# Patient Record
Sex: FEMALE | Race: WHITE | Employment: FULL TIME | URBAN - METROPOLITAN AREA
[De-identification: names, ages, dates, MRNs, and addresses within clinical notes are randomized per-mention and may not be internally consistent; named-entity substitution may affect disease eponyms.]

---

## 2017-04-06 ENCOUNTER — ANESTHESIA EVENT (OUTPATIENT)
Dept: ENDOSCOPY | Age: 62
End: 2017-04-06
Payer: OTHER GOVERNMENT

## 2017-04-07 ENCOUNTER — SURGERY (OUTPATIENT)
Age: 62
End: 2017-04-07

## 2017-04-07 ENCOUNTER — ANESTHESIA (OUTPATIENT)
Dept: ENDOSCOPY | Age: 62
End: 2017-04-07
Payer: OTHER GOVERNMENT

## 2017-04-07 ENCOUNTER — HOSPITAL ENCOUNTER (OUTPATIENT)
Age: 62
Setting detail: OUTPATIENT SURGERY
Discharge: HOME | End: 2017-04-07
Attending: INTERNAL MEDICINE | Admitting: INTERNAL MEDICINE
Payer: OTHER GOVERNMENT

## 2017-04-07 VITALS
SYSTOLIC BLOOD PRESSURE: 159 MMHG | HEIGHT: 68 IN | OXYGEN SATURATION: 99 % | WEIGHT: 143.44 LBS | TEMPERATURE: 97 F | DIASTOLIC BLOOD PRESSURE: 85 MMHG | BODY MASS INDEX: 21.74 KG/M2 | HEART RATE: 83 BPM | RESPIRATION RATE: 16 BRPM

## 2017-04-07 PROCEDURE — 76040000019: Performed by: INTERNAL MEDICINE

## 2017-04-07 PROCEDURE — 74011250636 HC RX REV CODE- 250/636: Performed by: NURSE ANESTHETIST, CERTIFIED REGISTERED

## 2017-04-07 PROCEDURE — 74011250636 HC RX REV CODE- 250/636

## 2017-04-07 PROCEDURE — 76060000031 HC ANESTHESIA FIRST 0.5 HR: Performed by: INTERNAL MEDICINE

## 2017-04-07 PROCEDURE — 74011000250 HC RX REV CODE- 250

## 2017-04-07 PROCEDURE — 77030009426 HC FCPS BIOP ENDOSC BSC -B: Performed by: INTERNAL MEDICINE

## 2017-04-07 PROCEDURE — 88305 TISSUE EXAM BY PATHOLOGIST: CPT | Performed by: INTERNAL MEDICINE

## 2017-04-07 RX ORDER — BISMUTH SUBSALICYLATE 262 MG
1 TABLET,CHEWABLE ORAL DAILY
COMMUNITY

## 2017-04-07 RX ORDER — PROPOFOL 10 MG/ML
INJECTION, EMULSION INTRAVENOUS
Status: DISCONTINUED | OUTPATIENT
Start: 2017-04-07 | End: 2017-04-07 | Stop reason: HOSPADM

## 2017-04-07 RX ORDER — LIDOCAINE HYDROCHLORIDE 20 MG/ML
INJECTION, SOLUTION EPIDURAL; INFILTRATION; INTRACAUDAL; PERINEURAL AS NEEDED
Status: DISCONTINUED | OUTPATIENT
Start: 2017-04-07 | End: 2017-04-07 | Stop reason: HOSPADM

## 2017-04-07 RX ORDER — SODIUM CHLORIDE, SODIUM LACTATE, POTASSIUM CHLORIDE, CALCIUM CHLORIDE 600; 310; 30; 20 MG/100ML; MG/100ML; MG/100ML; MG/100ML
100 INJECTION, SOLUTION INTRAVENOUS CONTINUOUS
Status: DISCONTINUED | OUTPATIENT
Start: 2017-04-07 | End: 2017-04-07 | Stop reason: HOSPADM

## 2017-04-07 RX ORDER — PROPOFOL 10 MG/ML
INJECTION, EMULSION INTRAVENOUS AS NEEDED
Status: DISCONTINUED | OUTPATIENT
Start: 2017-04-07 | End: 2017-04-07 | Stop reason: HOSPADM

## 2017-04-07 RX ADMIN — SODIUM CHLORIDE, SODIUM LACTATE, POTASSIUM CHLORIDE, AND CALCIUM CHLORIDE 100 ML/HR: 600; 310; 30; 20 INJECTION, SOLUTION INTRAVENOUS at 09:57

## 2017-04-07 RX ADMIN — PROPOFOL 50 MG: 10 INJECTION, EMULSION INTRAVENOUS at 11:55

## 2017-04-07 RX ADMIN — PROPOFOL 140 MCG/KG/MIN: 10 INJECTION, EMULSION INTRAVENOUS at 11:41

## 2017-04-07 RX ADMIN — PROPOFOL 100 MG: 10 INJECTION, EMULSION INTRAVENOUS at 11:40

## 2017-04-07 RX ADMIN — LIDOCAINE HYDROCHLORIDE 100 MG: 20 INJECTION, SOLUTION EPIDURAL; INFILTRATION; INTRACAUDAL; PERINEURAL at 11:40

## 2017-04-07 NOTE — DISCHARGE INSTRUCTIONS
For the next 12 hours you should not:   1. drive   2. drink alcohol   3. operate any machinery   4. engage in activities that require mental sharpness or manual dexterity such as     cooking   5. take any drugs other than those prescribed by a physician   6. make any legal or financial decisions  Call your doctor's office immediately, if:   1. increased and continuing rectal bleeding   2. fever   3. increased abdominal pain    Take it easy today and resume normal activity tomorrow. Resume previous diet. Colonoscopy: What to Expect at [address] [place] [address]  After you have a colonoscopy, you will stay at the clinic for 1 to 2 hours until the medicines wear off. Then you can go home. But you will need to arrange for a ride. Your doctor will tell you when you can eat and do your other usual activities. Your doctor will talk to you about when you will need your next colonoscopy. Your doctor can help you decide how often you need to be checked. This will depend on the results of your test and your risk for colorectal cancer. After the test, you may be bloated or have gas pains. You may need to pass gas. If a biopsy was done or a polyp was removed, you may have streaks of blood in your stool (feces) for a few days. This care sheet gives you a general idea about how long it will take for you to recover. But each person recovers at a different pace. Follow the steps below to get better as quickly as possible. How can you care for yourself at home? Activity  · Rest when you feel tired. · You can do your normal activities when it feels okay to do so. Diet  · Follow your doctor's directions for eating. · Unless your doctor has told you not to, drink plenty of fluids. This helps to replace the fluids that were lost during the colon prep. · Do not drink alcohol. Medicines  · Your doctor will tell you if and when you can restart your medicines.  He or she will also give you instructions about taking any new medicines. · If you take blood thinners, such as warfarin (Coumadin), clopidogrel (Plavix), or aspirin, be sure to talk to your doctor. He or she will tell you if and when to start taking those medicines again. Make sure that you understand exactly what your doctor wants you to do. · If polyps were removed or a biopsy was done during the test, your doctor may tell you not to take aspirin or other anti-inflammatory medicines for a few days. These include ibuprofen (Advil, Motrin) and naproxen (Aleve). Other instructions  · For your safety, do not drive or operate machinery until the medicine wears off and you can think clearly. Your doctor may tell you not to drive or operate machinery until the day after your test.  · Do not sign legal documents or make major decisions until the medicine wears off and you can think clearly. The anesthesia can make it hard for you to fully understand what you are agreeing to. Follow-up care is a key part of your treatment and safety. Be sure to make and go to all appointments, and call your doctor if you are having problems. It's also a good idea to know your test results and keep a list of the medicines you take. When should you call for help? Call 911 anytime you think you may need emergency care. For example, call if:  · You passed out (lost consciousness). · You pass maroon or bloody stools. · You have severe belly pain. Call your doctor now or seek immediate medical care if:  · Your stools are black and tarlike. · Your stools have streaks of blood, but you did not have a biopsy or any polyps removed. · You have belly pain, or your belly is swollen and firm. · You vomit. · You have a fever. · You are very dizzy. Watch closely for changes in your health, and be sure to contact your doctor if you have any problems. Where can you learn more? Go to http://chelo-andre.info/.   Enter E264 in the search box to learn more about \"Colonoscopy: What to Expect at Home. \"  Current as of: August 9, 2016  Content Version: 11.2  © 5976-2491 [employer]. Care instructions adapted under license by [employer] (which disclaims liability or warranty for this information). If you have questions about a medical condition or this instruction, always ask your healthcare professional. [facility] any warranty or liability for your use of this information. DISCHARGE SUMMARY from Nurse    The following personal items are in your possession at time of discharge:    Dental Appliances: Partials, At home  Visual Aid: Glasses                            PATIENT INSTRUCTIONS:    After general anesthesia or intravenous sedation, for 24 hours or while taking prescription Narcotics:  · Limit your activities  · Do not drive and operate hazardous machinery  · Do not make important personal or business decisions  · Do  not drink alcoholic beverages  · If you have not urinated within 8 hours after discharge, please contact your surgeon on call. Report the following to your surgeon:  · Excessive pain, swelling, redness or odor of or around the surgical area  · Temperature over 100.5  · Nausea and vomiting lasting longer than 4 hours or if unable to take medications  · Any signs of decreased circulation or nerve impairment to extremity: change in color, persistent  numbness, tingling, coldness or increase pain  · Any questions        What to do at Home:  Recommended activity: Activity as tolerated and no driving for today. *  Please give a list of your current medications to your Primary Care Provider. *  Please update this list whenever your medications are discontinued, doses are      changed, or new medications (including over-the-counter products) are added. *  Please carry medication information at all times in case of emergency situations.           These are general instructions for a healthy lifestyle:    No smoking/ No tobacco products/ Avoid exposure to second hand smoke    Surgeon General's Warning:  Quitting smoking now greatly reduces serious risk to your health. Obesity, smoking, and sedentary lifestyle greatly increases your risk for illness    A healthy diet, regular physical exercise & weight monitoring are important for maintaining a healthy lifestyle    You may be retaining fluid if you have a history of heart failure or if you experience any of the following symptoms:  Weight gain of 3 pounds or more overnight or 5 pounds in a week, increased swelling in our hands or feet or shortness of breath while lying flat in bed. Please call your doctor as soon as you notice any of these symptoms; do not wait until your next office visit. Recognize signs and symptoms of STROKE:    F-face looks uneven    A-arms unable to move or move unevenly    S-speech slurred or non-existent    T-time-call 911 as soon as signs and symptoms begin-DO NOT go       Back to bed or wait to see if you get better-TIME IS BRAIN. Warning Signs of HEART ATTACK     Call 911 if you have these symptoms:   Chest discomfort. Most heart attacks involve discomfort in the center of the chest that lasts more than a few minutes, or that goes away and comes back. It can feel like uncomfortable pressure, squeezing, fullness, or pain.  Discomfort in other areas of the upper body. Symptoms can include pain or discomfort in one or both arms, the back, neck, jaw, or stomach.  Shortness of breath with or without chest discomfort.  Other signs may include breaking out in a cold sweat, nausea, or lightheadedness. Don't wait more than five minutes to call 911 - MINUTES MATTER! Fast action can save your life. Calling 911 is almost always the fastest way to get lifesaving treatment. Emergency Medical Services staff can begin treatment when they arrive -- up to an hour sooner than if someone gets to the hospital by car.        The discharge information has been reviewed with the patient and spouse. The patient and spouse verbalized understanding. Discharge medications reviewed with the patient and spouse and appropriate educational materials and side effects teaching were provided. Patient armband removed and given to patient to take home.   Patient was informed of the privacy risks if armband lost or stolen

## 2017-04-07 NOTE — H&P
History and Physical    Lara Trenton  1955  [id number]  [id number]    Pre-Procedure Diagnosis:  chronic ulcerative pancoltis  k51.00    Chief Complaint:  No chief complaint on file. Evaluation of past illnesses, surgeries, or injuries:   YES  Past Medical History:   Diagnosis Date    Colitis, chronic[facility] ulcerative ([facility] 2002    Hypertension     Mesenteric ischemia ([facility]     Migraine      Past Surgical History:   Procedure Laterality Date    HX CHOLECYSTECTOMY  10/29/2014       Allergies:  No Known Allergies    Previous reactions to sedation/analgesia? NO    Review of current medications, supplement, herbals and nutraceuticals complete:  YES  [unfilled]     Pertinent labs reviewed? YES    History of substance abuse? NO  Family History   Problem Relation Age of Onset    Cancer Mother     Heart Disease Father     Parkinson's Disease Father     Breast Cancer Maternal Aunt      Social History     Social History    Marital status:      Spouse name: N/A    Number of children: N/A    Years of education: N/A     Occupational History    Not on file.      Social History Main Topics    Smoking status: Former Smoker     Types: Cigarettes     Quit date: 5/2/2014    Smokeless tobacco: Never Used      Comment: patient uses vapor cigarettes/ ecigarettes    Alcohol use 0.0 oz/week     0 Standard drinks or equivalent per week      Comment: rarely    Drug use: No    Sexual activity: Not Currently     Other Topics Concern    Not on file     Social History Narrative       Cardiac Status:  WNL    Mental Status:  WNL     Pulmonary Status:  WNL    NPO:  5-8    Dami Cobos MD  4/7/2017  11:12 AM

## 2017-04-07 NOTE — ANESTHESIA PREPROCEDURE EVALUATION
Anesthetic History   No history of anesthetic complications            Review of Systems / Medical History  Patient summary reviewed and pertinent labs reviewed    Pulmonary          Smoker         Neuro/Psych         Headaches     Cardiovascular    Hypertension: well controlled              Exercise tolerance: >4 METS     GI/Hepatic/Renal  Within defined limits              Endo/Other  Within defined limits           Other Findings   Comments:   Risk Factors for Postoperative nausea/vomiting:       History of postoperative nausea/vomiting? NO       Female? YES       Motion sickness? NO       Intended opioid administration for postoperative analgesia?   NO           Physical Exam    Airway  Mallampati: III  TM Distance: 4 - 6 cm  Neck ROM: normal range of motion   Mouth opening: Diminished (comment)     Cardiovascular    Rhythm: regular  Rate: normal         Dental    Dentition: Poor dentition     Pulmonary  Breath sounds clear to auscultation               Abdominal  GI exam deferred       Other Findings            Anesthetic Plan    ASA: 2  Anesthesia type: MAC            Anesthetic plan and risks discussed with: Patient

## 2017-04-07 NOTE — ANESTHESIA POSTPROCEDURE EVALUATION
Post-Anesthesia Evaluation and Assessment    Patient: Diaz Arroyo MRN: [id number]  SSN: [id number]    YOB: 1955  Age: 64 y.o. Sex: female      Data from PACU flowsheet    Cardiovascular Function/Vital Signs  Visit Vitals    /64 (BP 1 Location: Left arm, BP Patient Position: At rest)    Pulse 77    Temp 36.1 °C (97 °F)    Resp 16    Ht 5' 8\" (1.727 m)    Wt 65.1 kg (143 lb 7 oz)    SpO2 99%    BMI 21.81 kg/m2       Patient is status post MAC anesthesia for Procedure(s):  COLONOSCOPY. Nausea/Vomiting: controlled    Postoperative hydration reviewed and adequate. Pain:  Pain Scale 1: Numeric (0 - 10) (04/07/17 1223)  Pain Intensity 1: 0 (04/07/17 1223)   Managed      Mental Status and Level of Consciousness: Alert and oriented     Pulmonary Status:   O2 Device: Room air (04/07/17 1211)   Adequate oxygenation and airway patent    Complications related to anesthesia: None    Post-anesthesia assessment completed.  No concerns    Signed By: Stefan Ng CRNA     April 7, 2017

## 2017-04-07 NOTE — IP AVS SNAPSHOT
[address] [place] [address] [address] 
[phone] Patient: Anette Brooks MRN: [id number] TVX:7/09/0607 You are allergic to the following No active allergies Recent Documentation Height Weight BMI OB Status Smoking Status 1.727 m 65.1 kg 21.81 kg/m2 Postmenopausal Former Smoker Emergency Contacts Name Discharge Info Relation Home Work Mobile [facility] DISCHARGE CAREGIVER [3] Daughter [21] [phone] About your hospitalization You were admitted on:  April 7, 2017 You last received care in the:  [facility] PHASE 2 RECOVERY You were discharged on:  April 7, 2017 Unit phone number:  [phone] Why you were hospitalized Your primary diagnosis was:  Not on File Providers Seen During Your Hospitalizations Provider Role Specialty Primary office phone Cass Shell MD Attending Provider Gastroenterology [phone] Your Primary Care Physician (PCP) Primary Care Physician Office Phone Office Fax Katia Delon [phone] Follow-up Information Follow up With Details Comments Contact Info Kat De Guzman [place]   [address] [place] [id number] [address] [address] [phone] Cass Shell MD In 6 months  [address] [phone] Current Discharge Medication List  
  
CONTINUE these medications which have NOT CHANGED Dose & Instructions Dispensing Information Comments Morning Noon Evening Bedtime  
 balsalazide 750 mg capsule Commonly known as:  Mireille Mettle Your last dose was: Your next dose is:    
   
   
 Dose:  2250 mg Take 2,250 mg by mouth three (3) times daily. Refills:  0  
     
   
   
   
  
 lisinopril 20 mg tablet Commonly known as:  Tevin Lantigua Your last dose was: Your next dose is:    
   
   
 Dose:  20 mg Take 1 Tab by mouth daily. Quantity:  30 Tab Refills:  5  
     
   
   
   
  
 multivitamin tablet Commonly known as:  ONE A DAY Your last dose was: Your next dose is:    
   
   
 Dose:  1 Tab Take 1 Tab by mouth daily. Refills:  0 PROBIOTIC 4X 10-15 mg Tbec Generic drug:  B.infantis-B.ani-B.long-B.bifi Your last dose was: Your next dose is: Take  by mouth. Refills:  0 SUMAtriptan succinate 6 mg/0.5 mL kit Your last dose was: Your next dose is:    
   
   
 Dose:  6 mg  
6 mg by SubCUTAneous route once as needed for Migraine. Quantity:  1 Kit Refills:  3  
     
   
   
   
  
 traZODone 100 mg tablet Commonly known as:  Timothy Strickland Your last dose was: Your next dose is: TAKE 1 TABLET BY MOUTH NIGHTLY Quantity:  90 Tab Refills:  1 Discharge Instructions For the next 12 hours you should not: 1. drive 2. drink alcohol 3. operate any machinery 4. engage in activities that require mental sharpness or manual dexterity such as   
 cooking 5. take any drugs other than those prescribed by a physician 6. make any legal or financial decisions Call your doctor's office immediately, if: 
 1. increased and continuing rectal bleeding 2. fever 3. increased abdominal pain Take it easy today and resume normal activity tomorrow. Resume previous diet. Colonoscopy: What to Expect at [facility] Your Recovery After you have a colonoscopy, you will stay at the clinic for 1 to 2 hours until the medicines wear off. Then you can go home. But you will need to arrange for a ride. Your doctor will tell you when you can eat and do your other usual activities.  
Your doctor will talk to you about when you will need your next colonoscopy. Your doctor can help you decide how often you need to be checked. This will depend on the results of your test and your risk for colorectal cancer. After the test, you may be bloated or have gas pains. You may need to pass gas. If a biopsy was done or a polyp was removed, you may have streaks of blood in your stool (feces) for a few days. This care sheet gives you a general idea about how long it will take for you to recover. But each person recovers at a different pace. Follow the steps below to get better as quickly as possible. How can you care for yourself at home? Activity · Rest when you feel tired. · You can do your normal activities when it feels okay to do so. Diet · Follow your doctor's directions for eating. · Unless your doctor has told you not to, drink plenty of fluids. This helps to replace the fluids that were lost during the colon prep. · Do not drink alcohol. Medicines · Your doctor will tell you if and when you can restart your medicines. He or she will also give you instructions about taking any new medicines. · If you take blood thinners, such as warfarin (Coumadin), clopidogrel (Plavix), or aspirin, be sure to talk to your doctor. He or she will tell you if and when to start taking those medicines again. Make sure that you understand exactly what your doctor wants you to do. · If polyps were removed or a biopsy was done during the test, your doctor may tell you not to take aspirin or other anti-inflammatory medicines for a few days. These include ibuprofen (Advil, Motrin) and naproxen (Aleve). Other instructions · For your safety, do not drive or operate machinery until the medicine wears off and you can think clearly. Your doctor may tell you not to drive or operate machinery until the day after your test. 
· Do not sign legal documents or make major decisions until the medicine wears off and you can think clearly.  The anesthesia can make it hard for you to fully understand what you are agreeing to. Follow-up care is a key part of your treatment and safety. Be sure to make and go to all appointments, and call your doctor if you are having problems. It's also a good idea to know your test results and keep a list of the medicines you take. When should you call for help? Call 911 anytime you think you may need emergency care. For example, call if: 
· You passed out (lost consciousness). · You pass maroon or bloody stools. · You have severe belly pain. Call your doctor now or seek immediate medical care if: 
· Your stools are black and tarlike. · Your stools have streaks of blood, but you did not have a biopsy or any polyps removed. · You have belly pain, or your belly is swollen and firm. · You vomit. · You have a fever. · You are very dizzy. Watch closely for changes in your health, and be sure to contact your doctor if you have any problems. Where can you learn more? Go to http://chelo-andre.info/. Enter E264 in the search box to learn more about \"Colonoscopy: What to Expect at Home. \" Current as of: August 9, 2016 Content Version: 11.2 © 7001-4812 [employer]. Care instructions adapted under license by [employer] (which disclaims liability or warranty for this information). If you have questions about a medical condition or this instruction, always ask your healthcare professional. [place] [address] any warranty or liability for your use of this information. DISCHARGE SUMMARY from Nurse The following personal items are in your possession at time of discharge: 
 
Dental Appliances: Partials, At home Visual Aid: Glasses PATIENT INSTRUCTIONS: 
 
 
F-face looks uneven A-arms unable to move or move unevenly S-speech slurred or non-existent T-time-call 911 as soon as signs and symptoms begin-DO NOT go Back to bed or wait to see if you get better-TIME IS BRAIN. Warning Signs of HEART ATTACK Call 911 if you have these symptoms: 
? Chest discomfort. Most heart attacks involve discomfort in the center of the chest that lasts more than a few minutes, or that goes away and comes back. It can feel like uncomfortable pressure, squeezing, fullness, or pain. ? Discomfort in other areas of the upper body. Symptoms can include pain or discomfort in one or both arms, the back, neck, jaw, or stomach. ? Shortness of breath with or without chest discomfort. ? Other signs may include breaking out in a cold sweat, nausea, or lightheadedness. Don't wait more than five minutes to call [address]! Fast action can save your life. Calling 911 is almost always the fastest way to get lifesaving treatment. Emergency Medical Services staff can begin treatment when they arrive  up to an hour sooner than if someone gets to the hospital by car. The discharge information has been reviewed with the patient and spouse. The patient and spouse verbalized understanding. Discharge medications reviewed with the patient and spouse and appropriate educational materials and side effects teaching were provided. Patient armband removed and given to patient to take home. Patient was informed of the privacy risks if armband lost or stolen Discharge Orders None Introducing [facility] SERVICES! Dear Brianna Zacarias: Thank you for requesting a [employer] account. Our records indicate that you already have an active [employer] account. You can access your account anytime at [email or web address] Did you know that you can access your hospital and ER discharge instructions at any time in [employer]? You can also review all of your test results from your hospital stay or ER visit. Additional Information If you have questions, please visit the Frequently Asked Questions section of the MyChart website at [email or web address] Remember, MyChart is NOT to be used for urgent needs. For medical emergencies, dial 911. Now available from your iPhone and Android! General Information Please provide this summary of care documentation to your next provider. Patient Signature:  ____________________________________________________________ Date:  ____________________________________________________________  
  
Leighann [place] Provider Signature:  ____________________________________________________________ Date:  ____________________________________________________________

## 2017-04-07 NOTE — PROCEDURES
Colonoscopy Report    Patient: Lara Bernal MRN: [id number]  SSN: [id number]    YOB: 1955  Age: 64 y.o. Sex: female      Date of Procedure: 4/7/2017    IMPRESSION:  1. Ulcerative colitis in deep remission, surveillance biopsies taken. 2. Diminutive transverse colon polyp, biopsied off.   3. Sigmoid diverticulosis. 4. Small internal hemorrhoids. RECOMMENDATIONS:  1. Await pathology. 2. Continue current therapy. Indication:  Hx of ulcerative colitis, here for a surveillance exam.   Procedure Performed: Colonoscopy biopsy, polypectomy (cold biopsy)  Endoscopist: Dami Cobos MD  Assistant: Fidela Cassis, Reyes Clan  ASA: ASA 2 - Patient with mild systemic disease with no functional limitations  Mallampati Score: II (soft palate, uvula, fauces visible)  Anesthesia: MAC anesthesia  Endoscope: [id number]  Extent of Examination:terminal ileum  Quality of Preparation:     [x]  Excellent   []  Very Good   [] Fair but adequate   [] Fair, inadequate   []  Poor      Technique: The procedure was discussed with the patient including risks, benefits, alternatives including risks of IV sedation, bleeding, perforation and missed polyp. A safety timeout was performed. The patient was given incremental doses of Versed and Demerol to achieve moderate conscious sedation. The patients vital signs were monitored at all times including heart rate, rhythm, blood pressure and oxygen saturation. The patient was placed in left lateral position. When adequate sedation was achieved a perianal inspection and a digital rectal exam were performed. Video colonoscope was introduced into the rectum and advanced under direct vision up to the terminal ileum. The cecum was identified by IC valve, appendiceal orifice and crows foot. With adequate insufflation and maneuvering of the withdrawing scope, the colonic mucosa was visualized carefully.  Retroflexion was performed in the rectum and the distal rectum visualized. The patient tolerated the procedure very well and was transferred to recovery area. Findings: No ulcers, erosions, inflammatory changes, vascular anomalies, or large mass lesions were seen Biopsies were taken to rule out dysplasia, from four quadrants at 10 cm intervals, and placed in four containers labeled ascending/right colon, transverse colon, descending colon, and rectosigmoid colon. A 3 mm sessile polyp was seen in the transverse colon, and was biopsied off. Diverticula were seen in the sigmoid colon. Small internal hemorrhoids were noted.    EBL: minimal  Specimen:   ID Type Source Tests Collected by Time Destination   1 : bx random right colon r/o dysplasia Preservative Colon, Ascending  Berny Manley MD 4/7/2017 1143 Pathology   2 : Bx random transverse colon r/o dysplasia Preservative Colon, Transverse  Berny Manley MD 4/7/2017 1144 Pathology   3 : bx polyp Preservative Colon, Transverse  Berny Manley MD 4/7/2017 1144 Pathology   4 : bx random descending colon r/o dysplasia Preservative Colon, Descending  Berny Manley MD 4/7/2017 1153 Pathology   5 : random bx rectal sigmoid r/ dysplasia Preservative Colon, Recto-sigmoid  Berny Manley MD 4/7/2017 1157 Pathology       Berny Manley MD  April 7, 2017  12:11 PM

## 2017-05-23 RX ORDER — TRAZODONE HYDROCHLORIDE 100 MG/1
TABLET ORAL
Qty: 90 TAB | Refills: 1 | Status: SHIPPED | OUTPATIENT
Start: 2017-05-23 | End: 2017-11-13 | Stop reason: SDUPTHER

## 2017-07-19 RX ORDER — LISINOPRIL 20 MG/1
TABLET ORAL
Qty: 30 TAB | Refills: 5 | Status: SHIPPED | OUTPATIENT
Start: 2017-07-19 | End: 2018-01-11 | Stop reason: SDUPTHER

## 2017-08-03 ENCOUNTER — OFFICE VISIT (OUTPATIENT)
Dept: FAMILY MEDICINE CLINIC | Age: 62
End: 2017-08-03

## 2017-08-03 VITALS
DIASTOLIC BLOOD PRESSURE: 76 MMHG | SYSTOLIC BLOOD PRESSURE: 138 MMHG | OXYGEN SATURATION: 93 % | HEIGHT: 68 IN | RESPIRATION RATE: 18 BRPM | WEIGHT: 147.2 LBS | TEMPERATURE: 98.8 F | HEART RATE: 89 BPM | BODY MASS INDEX: 22.31 KG/M2

## 2017-08-03 DIAGNOSIS — D89.89 AUTOIMMUNE DISORDER (HCC): ICD-10-CM

## 2017-08-03 DIAGNOSIS — L98.9 SKIN LESION: ICD-10-CM

## 2017-08-03 DIAGNOSIS — H81.10 BENIGN POSITIONAL VERTIGO, UNSPECIFIED LATERALITY: ICD-10-CM

## 2017-08-03 DIAGNOSIS — E87.6 HYPOKALEMIA: Primary | ICD-10-CM

## 2017-08-03 DIAGNOSIS — Z13.220 SCREENING CHOLESTEROL LEVEL: ICD-10-CM

## 2017-08-03 NOTE — PROGRESS NOTES
Oly Lam is a 58 y.o. female here today for HTN follow-up      1. Have you been to the ER, urgent care clinic since your last visit? Hospitalized since your last visit? No    2. Have you seen or consulted any other health care providers outside of the [address] [place] [address] since your last visit? Include any pap smears or colon screening.  Yes Reason for visit: Gastroenterology

## 2017-08-03 NOTE — PROGRESS NOTES
Subjective:     HPI:  Consuelo Marie is a 58 y.o. female who presents to the office for medication refill. Pt complains of dizziness and skin lesion. Dizziness: Pt reports that she is experiencing dizziness when lying down or when she is moving her head forward and backward. Pt reports that she is afraid that she will fall out when in shower or when vacuuming the ceiling. Pt thinks that she has symptoms of BPVV, and would like to get tested for it. Hypertension  The patient presents for follow up of hypertension. Pt's BP is 138/76 in the office today. Cardiovascular ROS: taking medications as instructed, no medication side effects noted, no TIA's, no chest pain on exertion, no dyspnea on exertion, no swelling of ankles. Skin lesion:  Pt has concerns for skin cancer due to occurrence of multiple skin tags in her upper body. Pt has also noted skin changes near her facial cleft. Pt requests referral to dermatology. Of note,   Pt followed up with Rheumatologist, Dr. Yessenia Martinez after having a positive MITZI test. Dr Yessenia Martinez stated that pt's symptoms weren't too significant. Pt was advised to be monitor her symptoms and follow up if worsening. Pt stopped taking potassium supplement. Pt requests lab work. Current Outpatient Prescriptions   Medication Sig Dispense Refill    lisinopril (PRINIVIL, ZESTRIL) 20 mg tablet TAKE 1 TAB BY MOUTH DAILY. 30 Tab 5    traZODone (DESYREL) 100 mg tablet TAKE 1 TABLET BY MOUTH NIGHTLY 90 Tab 1    multivitamin (ONE A DAY) tablet Take 1 Tab by mouth daily.  B.infantis-B.ani-B.long-B.bifi (PROBIOTIC 4X) 10-15 mg TbEC Take  by mouth.  SUMAtriptan succinate 6 mg/0.5 mL kit 6 mg by SubCUTAneous route once as needed for Migraine. 1 Kit 3    balsalazide (COLAZAL) 750 mg capsule Take 2,250 mg by mouth three (3) times daily.           No Known Allergies    Past Medical History:   Diagnosis Date    Colitis, chronic, ulcerative ([facility] [address] [facility] 2002    Hypertension  Mesenteric ischemia (HCC)     Migraine         Past Surgical History:   Procedure Laterality Date    COLONOSCOPY N/A 4/7/2017    COL[facility]ard [facility] ENDOSCOPY    HX CHOLECYSTECTOMY  10/29/2014       Family History   Problem Relation Age of Onset    Cancer Mother     Heart Disease Father     Parkinson's Disease Father     Breast Cancer Maternal Aunt        Social History     Social History    Marital status:      Spouse name: N/A    Number of children: N/A    Years of education: N/A     Occupational History    Not on file. Social History Main Topics    Smoking status: Former Smoker     Types: Cigarettes     Quit date: 5/2/2014    Smokeless tobacco: Never Used      Comment: patient uses vapor cigarettes/ ecigarettes    Alcohol use 0.0 oz/week     0 Standard drinks or equivalent per week      Comment: rarely    Drug use: No    Sexual activity: Not Currently     Other Topics Concern    Not on file     Social History Narrative       REVIEW OF SYSTEM:  Review of Systems   Constitutional: Negative for chills and fever. Eyes: Negative for blurred vision. Respiratory: Negative for shortness of breath. Cardiovascular: Negative for chest pain, palpitations and leg swelling. Gastrointestinal: Negative for constipation, diarrhea, nausea and vomiting. Musculoskeletal: Negative for joint pain. Neurological: Positive for dizziness. Negative for headaches. Objective:     Visit Vitals    /76    Pulse 89    Temp 98.8 °F (37.1 °C)    Resp 18    Ht 5' 8\" (1.727 m)    Wt 147 lb 3.2 oz (66.8 kg)    SpO2 93%    BMI 22.38 kg/m2       PHYSICAL EXAM:  Physical Exam   Constitutional: She is oriented to person, place, and time and well-developed, well-nourished, and in no distress.    HENT:   Right Ear: Tympanic membrane, external ear and ear canal normal.   Left Ear: Tympanic membrane, external ear and ear canal normal.   Nose: Nose normal. Mouth/Throat: Oropharynx is clear and moist.   Eyes: Pupils are equal, round, and reactive to light. Neck: Normal range of motion. Neck supple. No thyromegaly present. Cardiovascular: Normal rate, regular rhythm, normal heart sounds and intact distal pulses. No murmur heard. Pulmonary/Chest: Effort normal and breath sounds normal. She has no wheezes. Neurological: She is alert and oriented to person, place, and time. GCS score is 15. Skin: Skin is warm and dry. Multiple skin tags noted    Vitals reviewed. Assessment/Plan:       ICD-10-CM ICD-9-CM    1. Hypokalemia M32.8 765.3 METABOLIC PANEL, COMPREHENSIVE   2. Screening cholesterol level Z13.220 V77.91 LIPID PANEL   3. Benign positional vertigo, unspecified laterality H81.10 386.11 REFERRAL TO PHYSICAL THERAPY   4. Autoimmune disorder ([facility] M35.9 279.49 MITZI, DIRECT, W/REFLEX   5. Skin lesion L98.9 709.9 REFERRAL TO DERMATOLOGY     Patient given opportunity to ask questions. Questions answered. Labs drawn in office today. Pt ate a sandwich earlier in the day. Pt has not had lunch. Patient understands plan of care. Follow-up Disposition:  Return in about 1 month (around 9/3/2017) for follow up labs. .          Written by Manuela Huynh, as dictated by Edvin Kirkland DO.    I, Dr. Edvin Kirkland, confirm that all documentation is accurate.

## 2017-08-03 NOTE — PATIENT INSTRUCTIONS
Epley Maneuver at Home for Vertigo: Exercises  Your Care Instructions  Vertigo is a spinning or whirling sensation when you move your head. Your doctor may have moved you in different positions to help your vertigo get better faster. This is called the Epley maneuver. Your doctor also may have asked you to do these exercises at home. Do the exercises as often as your doctor recommends. If your vertigo is getting worse, your doctor may have you change the exercise or stop it. How to do the exercises  Step 1    Sit on the edge of a bed or sofa. Step 2    Turn your head 45 degrees in the direction your doctor told you to. This may be toward the ear that causes the most vertigo for you. Step 3    Tilt yourself backward until you are lying on your back. Your head should still be at a 45-degree turn. Your head should be about midway between looking straight ahead and looking out to your side. Hold for 30 seconds. If you have vertigo, stay in this position until it stops. Step 4    Turn your head 90 degrees toward the ear that has the least vertigo. The point of your chin should be over your shoulder. Hold for 30 seconds. Step 5    Roll onto the side of the ear with the least vertigo. You should now be looking at the floor. Follow-up care is a key part of your treatment and safety. Be sure to make and go to all appointments, and call your doctor if you are having problems. It's also a good idea to know your test results and keep a list of the medicines you take. Where can you learn more? Go to http://chelo-andre.info/. Enter 470 0010 in the search box to learn more about \"Epley Maneuver at Home for Vertigo: Exercises. \"  Current as of: March 8, 2017  Content Version: 11.3  © 3264-8416 Healthwise, Incorporated. Care instructions adapted under license by [employer] (which disclaims liability or warranty for this information).  If you have questions about a medical condition or this instruction, always ask your healthcare professional. [place] [address] any warranty or liability for your use of this information.

## 2017-08-03 NOTE — MR AVS SNAPSHOT
Visit Information Date & Time Provider Department Dept. Phone Encounter #  
 8/3/2017  1:20 PM Ino Kern 6 [phone] [id number] Follow-up Instructions Return in about 1 month (around 9/3/2017) for follow up labs. Tonia Rivera Upcoming Health Maintenance Date Due DTaP/Tdap/Td series (1 - Tdap) 6/13/1976 INFLUENZA AGE 9 TO ADULT 8/1/2017 BREAST CANCER SCRN MAMMOGRAM 12/15/2017 PAP AKA CERVICAL CYTOLOGY 12/11/2018 COLONOSCOPY 4/7/2019 Allergies as of 8/3/2017  Review Complete On: 8/3/2017 By: Makenna Comer LPN No Known Allergies Current Immunizations  Never Reviewed Name Date Influenza Vaccine (Quad) PF 11/15/2016, 11/4/2015 Zoster Vaccine, Live 12/8/2015 Not reviewed this visit You Were Diagnosed With   
  
 Codes Comments Hypokalemia    -  Primary ICD-10-CM: E87.6 ICD-9-CM: 276.8 Screening cholesterol level     ICD-10-CM: J34.666 ICD-9-CM: V77.91 Benign positional vertigo, unspecified laterality     ICD-10-CM: H81.10 ICD-9-CM: 386.11 [facility]     ICD-10-CM: M35.9 ICD-9-CM: 279.49 Skin lesion     ICD-10-CM: L98.9 ICD-9-CM: 709.9 Vitals BP Pulse Temp Resp Height(growth percentile) Weight(growth percentile) 138/76 89 98.8 °F (37.1 °C) 18 5' 8\" (1.727 m) 147 lb 3.2 oz (66.8 kg) SpO2 BMI OB Status Smoking Status 93% 22.38 kg/m2 Postmenopausal Former Smoker Vitals History BMI and BSA Data Body Mass Index Body Surface Area  
 22.38 kg/m 2 1.79 m 2 Preferred Pharmacy Pharmacy Name Phone [id number] [address], [address] [place] [address] [phone] Your Updated Medication List  
  
   
This list is accurate as of: 8/3/17  2:59 PM.  Always use your most recent med list.  
  
  
  
  
 balsalazide 750 mg capsule Commonly known as:  Ashia Callander Take 2,250 mg by mouth three (3) times daily. lisinopril 20 mg tablet Commonly known as:  PRINIVIL, ZESTRIL  
TAKE 1 TAB BY MOUTH DAILY. multivitamin tablet Commonly known as:  ONE A DAY Take 1 Tab by mouth daily. PROBIOTIC 4X 10-15 mg Tbec Generic drug:  B.infantis-B.ani-B.long-B.bifi Take  by mouth. SUMAtriptan succinate 6 mg/0.5 mL kit 6 mg by SubCUTAneous route once as needed for Migraine. traZODone 100 mg tablet Commonly known as:  DESYREL  
TAKE 1 TABLET BY MOUTH NIGHTLY We Performed the Following REFERRAL TO DERMATOLOGY [REF19 Custom] Comments:  
 Please evaluate patient for skin cancer screening and abnormal skin lesions. REFERRAL TO PHYSICAL THERAPY [WJR06 Custom] Comments:  
 Please evaluate patient for dizziness and Epley's Maneuver. Please schedule and authorize patient for services evaluate and treat as needed. Follow-up Instructions Return in about 1 month (around 9/3/2017) for follow up labs. Fidencio Green To-Do List   
 08/03/2017 Lab:  MITZI, DIRECT, W/REFLEX   
  
 08/03/2017 Lab:  LIPID PANEL   
  
 08/03/2017 Lab:  METABOLIC PANEL, COMPREHENSIVE Referral Information Referral ID Referred By Referred To  
  
 [id number] [address], [address] [place] [address] Phone: [phone] Visits Status Start Date End Date 1 New Request 8/3/17 8/3/18 If your referral has a status of pending review or denied, additional information will be sent to support the outcome of this decision. Referral ID Referred By Referred To  
 [id number] Yamini HA Not Available Visits Status Start Date End Date 1 New Request 8/3/17 8/3/18 If your referral has a status of pending review or denied, additional information will be sent to support the outcome of this decision. Patient Instructions Epley Maneuver at Home for Vertigo: Exercises Your Care Instructions Vertigo is a spinning or whirling sensation when you move your head. Your doctor may have moved you in different positions to help your vertigo get better faster. This is called the Epley maneuver. Your doctor also may have asked you to do these exercises at home. Do the exercises as often as your doctor recommends. If your vertigo is getting worse, your doctor may have you change the exercise or stop it. How to do the exercises Step 1 Sit on the edge of a bed or sofa. Step 2 Turn your head 45 degrees in the direction your doctor told you to. This may be toward the ear that causes the most vertigo for you. Step 3 Tilt yourself backward until you are lying on your back. Your head should still be at a 45-degree turn. Your head should be about midway between looking straight ahead and looking out to your side. Hold for 30 seconds. If you have vertigo, stay in this position until it stops. Step 4 Turn your head 90 degrees toward the ear that has the least vertigo. The point of your chin should be over your shoulder. Hold for 30 seconds. Step 5 Roll onto the side of the ear with the least vertigo. You should now be looking at the floor. Follow-up care is a key part of your treatment and safety. Be sure to make and go to all appointments, and call your doctor if you are having problems. It's also a good idea to know your test results and keep a list of the medicines you take. Where can you learn more? Go to http://chelo-andre.info/. Enter 470 0310 in the search box to learn more about \"Epley Maneuver at Home for Vertigo: Exercises. \" Current as of: March 8, 2017 Content Version: 11.3 © 2027-7535 [employer]AT [employer]. Care instructions adapted under license by [employer] (which disclaims liability or warranty for this information).  If you have questions about a medical condition or this instruction, always ask your healthcare professional. [employer] disclaims any warranty or liability for your use of this information. Introducing [facility]! Dear Yasmine Peck: Thank you for requesting a nScaled account. Our records indicate that you already have an active nScaled account. You can access your account anytime at [email or web address] Did you know that you can access your hospital and ER discharge instructions at any time in nScaled? You can also review all of your test results from your hospital stay or ER visit. Additional Information If you have questions, please visit the Frequently Asked Questions section of the nScaled website at [email or web address] Remember, nScaled is NOT to be used for urgent needs. For medical emergencies, dial 911. Now available from your iPhone and Android! Please provide this summary of care documentation to your next provider. Your primary care clinician is listed as Jamin Bojorquez. If you have any questions after today's visit, please call [phone].

## 2017-09-01 ENCOUNTER — HOSPITAL ENCOUNTER (OUTPATIENT)
Dept: PHYSICAL THERAPY | Age: 62
Discharge: HOME | End: 2017-09-01
Payer: OTHER GOVERNMENT

## 2017-09-01 PROCEDURE — 97161 PT EVAL LOW COMPLEX 20 MIN: CPT

## 2017-09-01 PROCEDURE — 95992 CANALITH REPOSITIONING PROC: CPT

## 2017-09-01 NOTE — PROGRESS NOTES
PHYSICAL THERAPY - DAILY TREATMENT NOTE    Patient Name: Deepali Gerard        Date: 2017  : 1955   YES Patient  Verified  Visit #:   1   of   8  Insurance: Payor: [employer] / Plan: Devin Buff / Product Type: Commerical /      In time: 1:30 Out time: 2:20   Total Treatment Time: 50     Medicare Time Tracking (below)   Total Timed Codes (min):  NA 1:1 Treatment Time:  NA     TREATMENT AREA =  Dizziness/vertigo    SUBJECTIVE    Pain Level (on 0 to 10 scale):  0  / 10   Medication Changes/New allergies or changes in medical history, any new surgeries or procedures? NO    If yes, update Summary List   Subjective Functional Status/Changes:  []  No changes reported     Pt reports that she was diagnosed with BPPV by Dr. Abi Hendricks, no testing performed. Pt reports that if she lies flat in bed or rolls over in bed, she gets dizzy/spinning feeling. Pt reports that if she looks up, she gets dizzy/spinning feeling. Pt reports that she is concerned that she is going to fall. Pt reports no prior h/o vertigo. Pt reports that current symptoms have been present for ~ 1 year. Pt reports no injury or illness. Pt denies falls. Pt denies pain/numbness or tingling/weakness. OBJECTIVE    Physical Therapy Evaluation - Vestibular    Posture:  [x] WNL      [] Forward head    [] Protracted shoulders    [] Retracted shoulders  [] Kyphosis:  [] increased   [] decreased   [] Lordosis:   [] increased   [] decreased  Other:    C/S ROM: [x] WFL    [] Limited    Describe: Dizziness with ext, R lat flex     Strength: [x] WFL    [] Limited    Describe:    Optional Tests:  Sensation:  [x] Intact [] Diminished    Describe:    Proprioception: [x] Intact [] Diminished    Describe:    Coordination Testing:       Disdiadochokinesia [x] WFL    [] Impaired    Describe:       Heel - Shin  [x] [facility]    [] Impaired    Describe:       FNF   [x] [facility]    [] Impaired    Describe:        Toe Tap   [x] [facility]    [] Impaired Describe:    Oculomotor Tests: (Fixation Not Blocked)       Ocular ROM:   [x] [facility]    [] Limited    Describe:       Spontaneous Nystag. [x] Neg     [] Pos    [] Left    [] Right       Gaze Holding Nystag. [x] Neg     [] Pos    [] Left    [] Right        Smooth Pursuit  [x] Neg     [] Pos    [] Left    [] Right        Saccades   [x] Neg     [] Pos    [] Left    [] Right        VOR - Slow Head Mvmt [x] Neg     [] Pos    [] Left    [] Right        VOR - Fast Head Mvmt [x] Neg     [] Pos    [] Left    [] Right        Head Thrust  [x] Neg     [] Pos    [] Left    [] Right        Static Visual Acuity [] Neg     [] Pos    [] Left    [] Right        Dynamic Visual Acuity [] Neg     [] Pos    [] Left    [] Right   Dizziness with smooth pursuit, saccades, VOR and head thrust - eye movements WNL    Other Special Tests:       Vertebral Artery Testing [] Neg     [] Pos    [] Left    [] Right       [place] Maneuver [] Neg     [x] Pos    [x] Left    [] Right       Roll Test   [] Neg     [] Pos    [] Left    [] Right       Hoff Balance Scale [] Neg     [] Pos    Score:       Dynamic Gait Index [] Neg     [] Pos    Score:       Functional Gait Assess. [] Neg     [x] Pos    Score: 26/30      Balance Standard Testing (Eyes Open/Eyes Closed - EO/EC)       Romberg   [x] WFL    [] Pos    Describe: 30\"/30\"          Stand on Foam  [x] WFL    [] Pos    Describe: 30\"/30\"       Standing on Rail  [] WFL    [] Pos    Describe: 30\"/12\"       Sharpened Romberg [] WFL    [x] Pos    Describe: 24\"R/30\"L; 3\"R/4\"L       Single Leg Stand  [] WFL    [x] Pos    Describe: 13\"L/12\"R    Motion Sensitivity:  [] Neg     [] Pos    Describe:    Computerized Dynamic Posturography:        [] Not Tested    [] WFL    Score:     Other Tests:    10 min Manual Therapy: CRM L   Rationale:      correct positional vertigo to improve patient's ability to perform ADLs/IADLs, functional mobility and gait safely and independently without increased symptoms    During MT min Patient Education:  YES  Reviewed HEP   []  Progressed/Changed HEP based on:   Reviewed post-CRM instructions; pt demo understanding     Other Objective/Functional Measures:    See eval       Post Treatment Pain Level (on 0 to 10) scale:   0  / 10     ASSESSMENT    Assessment/Changes in Function:     See plan of care    Justification for Eval Code Complexity:  Patient History : MEDIUM - migraines, HTN  Examination HIGH - See objective  Clinical Presentation: LOW  Clinical Decision Making : LOW - FOTO 93/100     []  See Progress Note/Recertification   Patient will continue to benefit from skilled PT services: see plan of care   Progress toward goals / Updated goals:    See plan of care       PLAN    [x]  Upgrade activities as tolerated YES Continue plan of care   []  Discharge due to :    []  Other:      Therapist: Luis Miguel Ramos PT    Date: 9/1/2017 Time: 1:34 PM

## 2017-09-01 NOTE — PROGRESS NOTES
[address]  [facility] PHYSICAL THERAPY  [address] [facility] Saritha [address]rola, [address] - Phone: [phone]  Fax: [phone] / [address] [facility]  Patient Name: Pau Yañez : 1955   Medical   Diagnosis: Gait instability [R26.81] Treatment Diagnosis: Gait instability [R26.81]  BPPV, L [H81.12]   Onset Date: ~ 1 year ago     Referral Source: Reuben Dominguez DO Start of Care ([facility]): 2017   Prior Hospitalization: See medical history Provider #: [id number]   Prior Level of Function: Independent with ADLs, ambulation   Comorbidities: HTN, migraines, ulcerative colitis, IBS   Medications: Verified on Patient Summary List   The Plan of Care and following information is based on the information from the initial evaluation.   ===========================================================================================  Assessment / key information:  Patient is a 58 y.o. female who presents with complaints of dizziness/vertigo with lying supine, rolling over in bed and looking up, which began ~ 1 year ago. Patient demonstrates + Hallpike-Shaun on L consistent with BPPV L; CRM performed for correction of BPPV, L. Additionally, patient demonstrates dizziness with C/S ext and C/S SB L, and decreased balance with rail stance eyes closed = 12\" (normal = 30\")/sharpened Romberg eyes open = 24\"R/30\"L (normal = 30)/sharpened Romberg eyes closed = 3\"R/4\"L (normal = 30\")/single leg stance = 13\"L/12\"R (normal = 30\"). FGA (Functional Gait Assessment) = . Patient would benefit from skilled PT services to address these issues and improve function.   Thank you for this referral.  ==========================================================================================  Eval Complexity: History: MEDIUM  Complexity : 1-2 comorbidities / personal factors will impact the outcome/ POC Exam:HIGH Complexity : 4+ Standardized tests and measures addressing body structure, function, activity limitation and / or participation in recreation  Presentation: LOW Complexity : Stable, uncomplicated  Clinical Decision Making:LOW Complexity : FOTO score of 75-100Overall Complexity:LOW     Problem List: impaired gait/ balance, decrease ADL/ functional abilitiies, decrease activity tolerance, decrease transfer abilities and other diziness/vertigo affecting function   Treatment Plan may include any combination of the following: Therapeutic exercise, Therapeutic activities, Neuromuscular re-education, Physical agent/modality, Gait/balance training, Manual therapy, Patient education, Functional mobility training and Stair training  Patient / Family readiness to learn indicated by: asking questions, trying to perform skills and interest  Persons(s) to be included in education: patient (P)  Barriers to Learning/Limitations: None  Measures taken:    Patient Goal (s): \"Relief of dizziness when lying down or head is tilted. \"   Patient self reported health status: good  Rehabilitation Potential: good   Short Term Goals: To be accomplished in  2-4  weeks:  1. Patient will demonstrate negative Hallpike-[place] to indicate resolution of BPPV. 2. Patient will demonstrate compliance with HEP. 3. Patient will maintain sharpened Romberg eyes open 30\"B to increase safety in challenging environments.  Long Term Goals: To be accomplished in  4-8  weeks:  1. Patient will demonstrate independence with HEP. 2. Patient will maintain rail stance eyes closed 30\" to increase safety in challenging environments. 3. Patient will maintain single leg stance 20\"B to increase safety in challenging environments.   Frequency / Duration:   Patient to be seen  1  times per week for 4-8  weeks:  Patient / Caregiver education and instruction: activity modification and other post-CRM instruction    Therapist Signature: Mansi Ding PT Date: 4/6/7698   Certification Period: NA Time: 2:26 PM ===========================================================================================  I certify that the above Physical Therapy Services are being furnished while the patient is under my care. I agree with the treatment plan and certify that this therapy is necessary. Physician Signature:        Date:       Time:     Please sign and return to [employer] or you may fax the signed copy to [phone]. Thank you.

## 2017-09-15 ENCOUNTER — HOSPITAL ENCOUNTER (OUTPATIENT)
Dept: PHYSICAL THERAPY | Age: 62
Discharge: HOME | End: 2017-09-15
Payer: OTHER GOVERNMENT

## 2017-09-15 ENCOUNTER — OFFICE VISIT (OUTPATIENT)
Dept: FAMILY MEDICINE CLINIC | Age: 62
End: 2017-09-15

## 2017-09-15 VITALS
BODY MASS INDEX: 21.98 KG/M2 | RESPIRATION RATE: 16 BRPM | HEIGHT: 68 IN | OXYGEN SATURATION: 96 % | WEIGHT: 145 LBS | DIASTOLIC BLOOD PRESSURE: 67 MMHG | HEART RATE: 98 BPM | SYSTOLIC BLOOD PRESSURE: 135 MMHG | TEMPERATURE: 98.6 F

## 2017-09-15 DIAGNOSIS — I10 ESSENTIAL HYPERTENSION: Primary | ICD-10-CM

## 2017-09-15 DIAGNOSIS — Z23 ENCOUNTER FOR IMMUNIZATION: ICD-10-CM

## 2017-09-15 PROCEDURE — 97112 NEUROMUSCULAR REEDUCATION: CPT

## 2017-09-15 NOTE — PROGRESS NOTES
[address] [facility] PHYSICAL THERAPY  [address] [place] [address] Radha Gurrola, [address] - Phone: [phone]  Fax: [phone] SUMMARY  Patient Name: Ellen Yen : 1955   Treatment/Medical Diagnosis: Gait instability [R26.81]   Referral Source: Pura Quevedo DO     Date of Initial Visit: 17 Attended Visits: 2 Missed Visits: 0     SUMMARY OF TREATMENT  Pt's treatment consisted of CRM for treatment of (+) L Talon Gess, SOT testing on balance master and pt education. CURRENT STATUS  Pt was treated with CRM for L BPPV and reports sx's resolved afterward. Pt reports no dizziness or imbalance with ADL's. Pt's Sensory Organization Test indicates pt has normal use of somatosensory, visual and vestibular input with maintaining balance. Pt demo's improved balance by ability to perform stance on rail with eyes closed for 30 seconds and single leg stance with eyes open 30 seconds bilaterally. Goal/Measure of Progress Goal Met?   1.  1. Patient will demonstrate negative Hallpike-Shaun to indicate resolution of BPPV. Status at last Eval: (+) L Talon Gess Current Status: (-) Talon Gess B yes   2.  2. Patient will demonstrate compliance with HEP. Status at last Eval: NA Current Status: Pt compliance with post CRM instructions yes   3.  3. Patient will maintain sharpened Romberg eyes open 30\"B to increase safety in challenging environments. Status at last Eval:             24\"R/30\"L Current Status: 30\" B yes     RECOMMENDATIONS  Discontinue therapy. Progressing towards or have reached established goals. If you have any questions/comments please contact us directly at [phone]. Thank you for allowing us to assist in the care of your patient.     LPTA Signature: Elise Terrazas PTA Date: 9/15/17   Therapist Signature:  Time: 3:15 PM     Physician Signature:        Date:       Time:

## 2017-09-15 NOTE — PROGRESS NOTES
PHYSICAL THERAPY - DAILY TREATMENT NOTE    Patient Name: Michael Johnson        Date: 9/15/2017  : 1955   YES Patient  Verified  Visit #:   2   of   6  Insurance: Payor: [employer] / Plan: [place] / Product Type: Commerical /      In time: 2:05 Out time: 2:45   Total Treatment Time: 40     Medicare Time Tracking (below)   Total Timed Codes (min):  na 1:1 Treatment Time:  na     TREATMENT AREA =  Dizziness/vertigo    SUBJECTIVE    Pain Level (on 0 to 10 scale):  0  / 10   Medication Changes/New allergies or changes in medical history, any new surgeries or procedures? NO    If yes, update Summary List   Subjective Functional Status/Changes:  []  No changes reported     Pt reports feeling sx's for a couple hours after doing the CRM at her initial evaluation. Since then, pt reports that she followed the precautions like instructed and has since then resumed sleeping on her L side without any sx's. Pt denies any dizziness or imbalance with ADL's. OBJECTIVE    40 min Neuromuscular Re-ed: Kip Knack Testing L   SOT on Balance Master  Static standing balance ex per flow sheet. Rationale:  improve coordination, improve balance and increase proprioception to improve the patients ability to perform ADLs, functional mobility and gait safely and independently      min Patient Education:  YES  Reviewed HEP   [x]  Progressed/Changed HEP based on:   Pt advised to f/u with MD if sx's return     Other Objective/Functional Measures:    Pt (-) for Kip Knack test.     SOT composite score of 82     SR EO 30\" B   SLS EO 30\" B   Stance on Rail EC 30\"      Post Treatment Pain Level (on 0 to 10) scale:   0  / 10     ASSESSMENT    Assessment/Changes in Function:     Pt's SOT score indicates pt has normal use of somatosensory, visual and vestibular input with maintaining balance.       [x]  See Progress Note/Recertification   Patient will continue to benefit from skilled PT services to NA-See DC Summary Progress toward goals / Updated goals: · Short Term Goals: To be accomplished in  2-4  weeks:  1. Patient will demonstrate negative Hallpike-Shaun to indicate resolution of BPPV. MET  2. Patient will demonstrate compliance with HEP. Pt compliant with post CRM instruction   · 3. Patient will maintain sharpened Romberg eyes open 30\"B to increase safety in challenging environments. MET  · Long Term Goals: To be accomplished in  4-8  weeks:  1. Patient will demonstrate independence with HEP. NA  2. Patient will maintain rail stance eyes closed 30\" to increase safety in challenging environments. MET  3. Patient will maintain single leg stance 20\"B to increase safety in challenging environments.  MET       PLAN    []  Upgrade activities as tolerated NA Continue plan of care   [x]  Discharge due to : goals met   []  Other:      Therapist: Tamia Deras PTA    Date: 9/15/2017 Time: 3:09 PM     Future Appointments  Date Time Provider Elroy Valdes   9/15/2017 3:20 PM DO BLAYNE Cole   9/19/2017 5:30 PM [address] [place] [address]   9/29/2017 2:00 PM [address] [place] [address]

## 2017-09-15 NOTE — MR AVS SNAPSHOT
Visit Information Date & Time Provider Department Dept. Phone Encounter #  
 9/15/2017  3:20 PM Enrrique Stern, [address] [facility] [phone] [id number] Upcoming Health Maintenance Date Due DTaP/Tdap/Td series (1 - Tdap) 6/13/1976 INFLUENZA AGE 9 TO ADULT 8/1/2017 BREAST CANCER SCRN MAMMOGRAM 12/15/2017 PAP AKA CERVICAL CYTOLOGY 12/11/2018 COLONOSCOPY 4/7/2019 Allergies as of 9/15/2017  Review Complete On: 9/15/2017 By: Enrrique Stern, DO No Known Allergies Current Immunizations  Never Reviewed Name Date Influenza Vaccine (Quad) PF  Incomplete, 11/15/2016, 11/4/2015 Zoster Vaccine, Live 12/8/2015 Not reviewed this visit You Were Diagnosed With   
  
 Codes Comments Encounter for immunization    -  Primary ICD-10-CM: T44 ICD-9-CM: V03.89 Essential hypertension     ICD-10-CM: I10 
ICD-9-CM: 401.9 Vitals BP Pulse Temp Resp Height(growth percentile) Weight(growth percentile) 135/67 (BP 1 Location: Right arm, BP Patient Position: Sitting) 98 98.6 °F (37 °C) (Oral) 16 5' 8\" (1.727 m) 145 lb (65.8 kg) SpO2 BMI OB Status Smoking Status 96% 22.05 kg/m2 Postmenopausal Former Smoker BMI and BSA Data Body Mass Index Body Surface Area 22.05 kg/m 2 1.78 m 2 Preferred Pharmacy Pharmacy Name Phone [phone] [phone] Your Updated Medication List  
  
   
This list is accurate as of: 9/15/17  3:53 PM.  Always use your most recent med list.  
  
  
  
  
 balsalazide 750 mg capsule Commonly known as:  [place] Bowels Take 2,250 mg by mouth three (3) times daily. lisinopril 20 mg tablet Commonly known as:  PRINIVIL, ZESTRIL  
TAKE 1 TAB BY MOUTH DAILY. multivitamin tablet Commonly known as:  ONE A DAY Take 1 Tab by mouth daily. PROBIOTIC 4X 10-15 mg Tbec Generic drug:  B.infantis-B.ani-B.long-B.bifi Take  by mouth. SUMAtriptan succinate 6 mg/0.5 mL kit 6 mg by SubCUTAneous route once as needed for Migraine. traZODone 100 mg tablet Commonly known as:  DESYREL  
TAKE 1 TABLET BY MOUTH NIGHTLY We Performed the Following INFLUENZA VIRUS VAC QUAD,SPLIT,PRESV FREE SYRINGE IM [id number] CPT(R)] Introducing [facility] SERVICES! Dear Guevara Sung: Thank you for requesting a [facility] account. Our records indicate that you already have an active [facility] account. You can access your account anytime at [email or web address] Mobile Complete/[employer]) Did you know that you can access your hospital and ER discharge instructions at any time in [facility] [facility]? You can also review all of your test results from your hospital stay or ER visit. Additional Information If you have questions, please visit the Frequently Asked Questions section of the [facility] website at [email or web address] Mobile [employer])/. Remember, [facility] is NOT to be used for urgent needs. For medical emergencies, dial 911. Now available from your iPhone and Android! Please provide this summary of care documentation to your next provider. Your primary care clinician is listed as Susan Marcano. If you have any questions after today's visit, please call [phone].

## 2017-09-15 NOTE — PROGRESS NOTES
Subjective:     HPI:  Nakita Velasquez is a 58 y.o. female who presents to follow up on lab results. Labs reviewed: Triglycerides are elevated at 295 on 08/03/17. HDL and LDLs were within normal range. Lab Results   Component Value Date/Time    Cholesterol, total 161 12/08/2015 12:00 PM    HDL Cholesterol 51 12/08/2015 12:00 PM    LDL, calculated 83.8 12/08/2015 12:00 PM    VLDL, calculated 26.2 12/08/2015 12:00 PM    Triglyceride 131 12/08/2015 12:00 PM    CHOL/HDL Ratio 3.2 12/08/2015 12:00 PM     Lab Results   Component Value Date/Time    Sodium 142 12/08/2015 12:00 PM    Potassium 3.9 12/08/2015 12:00 PM    Chloride 103 12/08/2015 12:00 PM    CO2 31 12/08/2015 12:00 PM    Anion gap 8 12/08/2015 12:00 PM    Glucose 92 12/08/2015 12:00 PM    BUN 13 12/08/2015 12:00 PM    Creatinine 0.78 12/08/2015 12:00 PM    BUN/Creatinine ratio 17 12/08/2015 12:00 PM    GFR est AA >60 12/08/2015 12:00 PM    GFR est non-AA >60 12/08/2015 12:00 PM    Calcium 8.7 12/08/2015 12:00 PM    Bilirubin, total 0.4 12/08/2015 12:00 PM    AST (SGOT) 15 12/08/2015 12:00 PM    Alk. phosphatase 71 12/08/2015 12:00 PM    Protein, total 7.2 12/08/2015 12:00 PM    Albumin 4.0 12/08/2015 12:00 PM    Globulin 3.2 12/08/2015 12:00 PM    A-G Ratio 1.3 12/08/2015 12:00 PM    ALT (SGPT) 18 12/08/2015 12:00 PM       Pt has a positive MITZI and saw a rheumatologist that said to just monitor it and get it tested every year. Still do not have records recorded in chart, but I called their office and they are faxing the report today. They stated that the test came back negative. Benign paroxysmal positional vertigo: Pt states that she saw the physical therapist and got a maneuver done and now she fills great. Pt denies fever or chills. Requests to have flu shot today. Current Outpatient Prescriptions   Medication Sig Dispense Refill    lisinopril (PRINIVIL, ZESTRIL) 20 mg tablet TAKE 1 TAB BY MOUTH DAILY.  30 Tab 5    traZODone (DESYREL) 100 mg tablet TAKE 1 TABLET BY MOUTH NIGHTLY 90 Tab 1    multivitamin (ONE A DAY) tablet Take 1 Tab by mouth daily.  B.infantis-B.ani-B.long-B.bifi (PROBIOTIC 4X) 10-15 mg TbEC Take  by mouth.  SUMAtriptan succinate 6 mg/0.5 mL kit 6 mg by SubCUTAneous route once as needed for Migraine. 1 Kit 3    balsalazide (COLAZAL) 750 mg capsule Take 2,250 mg by mouth three (3) times daily. No Known Allergies    Past Medical History:   Diagnosis Date    Colitis, chronic, ulcerative ([facility] [address] 2002    Hypertension     Mesenteric ischemia (HCC)     Migraine         Past Surgical History:   Procedure Laterality Date    COLONOSCOPY N/A 4/7/2017    COL[facility] ENDOSCOPY    HX CHOLECYSTECTOMY  10/29/2014       Family History   Problem Relation Age of Onset    Cancer Mother     Heart Disease Father     Parkinson's Disease Father     Breast Cancer Maternal Aunt        Social History     Social History    Marital status:      Spouse name: N/A    Number of children: N/A    Years of education: N/A     Occupational History    Not on file. Social History Main Topics    Smoking status: Former Smoker     Types: Cigarettes     Quit date: 5/2/2014    Smokeless tobacco: Never Used      Comment: patient uses vapor cigarettes/ ecigarettes    Alcohol use 0.0 oz/week     0 Standard drinks or equivalent per week      Comment: rarely    Drug use: No    Sexual activity: Not Currently     Other Topics Concern    Not on file     Social History Narrative       REVIEW OF SYSTEM:  Review of Systems   Constitutional: Negative for chills and fever. Eyes: Negative for blurred vision. Respiratory: Negative for shortness of breath. Cardiovascular: Negative for chest pain, palpitations and leg swelling. Gastrointestinal: Negative for constipation, diarrhea, nausea and vomiting. Musculoskeletal: Negative for joint pain.    Neurological: Negative for headaches. Objective:     Visit Vitals    /67 (BP 1 Location: Right arm, BP Patient Position: Sitting)    Pulse 98    Temp 98.6 °F (37 °C) (Oral)    Resp 16    Ht 5' 8\" (1.727 m)    Wt 145 lb (65.8 kg)    SpO2 96%    BMI 22.05 kg/m2       PHYSICAL EXAM:  Physical Exam   Constitutional: She is oriented to person, place, and time and well-developed, well-nourished, and in no distress. HENT:   Right Ear: Tympanic membrane, external ear and ear canal normal.   Left Ear: Tympanic membrane, external ear and ear canal normal.   Nose: Nose normal.   Mouth/Throat: Oropharynx is clear and moist.   Eyes: Pupils are equal, round, and reactive to light. Neck: Normal range of motion. Neck supple. No thyromegaly present. Cardiovascular: Normal rate, regular rhythm, normal heart sounds and intact distal pulses. No murmur heard. Pulmonary/Chest: Effort normal and breath sounds normal. She has no wheezes. Neurological: She is alert and oriented to person, place, and time. GCS score is 15. Skin: Skin is warm and dry. Vitals reviewed. Assessment/Plan:   Will give flu shot today in office. ICD-10-CM ICD-9-CM    1. Essential hypertension I10 401.9    2. Encounter for immunization Z23 V03.89 INFLUENZA VIRUS VAC QUAD,SPLIT,PRESV FREE SYRINGE IM     Patient given opportunity to ask questions. Questions answered. Patient understands plan of care. Follow-up Disposition:  Return if symptoms worsen or fail to improve. Written by Cindy Garza, as dictated by Ritchie Galvan DO.    I, Dr. Ritchie Galvan, confirm that all documentation is accurate.

## 2017-09-15 NOTE — PROGRESS NOTES
1. Have you been to the ER, urgent care clinic since your last visit? Hospitalized since your last visit? No    2. Have you seen or consulted any other health care providers outside of the [address] [place] [address] since your last visit? Include any pap smears or colon screening.  No

## 2017-09-19 ENCOUNTER — HOSPITAL ENCOUNTER (OUTPATIENT)
Dept: PHYSICAL THERAPY | Age: 62
End: 2017-09-19
Payer: OTHER GOVERNMENT

## 2017-09-29 ENCOUNTER — APPOINTMENT (OUTPATIENT)
Dept: PHYSICAL THERAPY | Age: 62
End: 2017-09-29
Payer: OTHER GOVERNMENT

## 2017-11-15 RX ORDER — TRAZODONE HYDROCHLORIDE 100 MG/1
TABLET ORAL
Qty: 90 TAB | Refills: 1 | Status: SHIPPED | OUTPATIENT
Start: 2017-11-15 | End: 2018-05-14 | Stop reason: SDUPTHER

## 2017-11-22 DIAGNOSIS — G43.109 MIGRAINE WITH AURA AND WITHOUT STATUS MIGRAINOSUS, NOT INTRACTABLE: ICD-10-CM

## 2017-11-27 ENCOUNTER — DOCUMENTATION ONLY (OUTPATIENT)
Dept: FAMILY MEDICINE CLINIC | Age: 62
End: 2017-11-27

## 2017-11-29 RX ORDER — CEFUROXIME AXETIL 250 MG/1
TABLET ORAL
Qty: 1 KIT | Refills: 2 | Status: SHIPPED | OUTPATIENT
Start: 2017-11-29 | End: 2017-11-30 | Stop reason: SDUPTHER

## 2017-11-30 DIAGNOSIS — G43.109 MIGRAINE WITH AURA AND WITHOUT STATUS MIGRAINOSUS, NOT INTRACTABLE: ICD-10-CM

## 2017-12-08 RX ORDER — CEFUROXIME AXETIL 250 MG/1
TABLET ORAL
Qty: 1 KIT | Refills: 2 | Status: SHIPPED | OUTPATIENT
Start: 2017-12-08 | End: 2018-05-24 | Stop reason: SDUPTHER

## 2018-05-22 RX ORDER — TRAZODONE HYDROCHLORIDE 100 MG/1
TABLET ORAL
Qty: 90 TAB | Refills: 1 | Status: SHIPPED | OUTPATIENT
Start: 2018-05-22 | End: 2018-11-08 | Stop reason: SDUPTHER

## 2018-05-24 ENCOUNTER — OFFICE VISIT (OUTPATIENT)
Dept: FAMILY MEDICINE CLINIC | Age: 63
End: 2018-05-24

## 2018-05-24 VITALS
WEIGHT: 145 LBS | DIASTOLIC BLOOD PRESSURE: 80 MMHG | TEMPERATURE: 96.3 F | RESPIRATION RATE: 16 BRPM | SYSTOLIC BLOOD PRESSURE: 132 MMHG | HEART RATE: 88 BPM | HEIGHT: 68 IN | OXYGEN SATURATION: 98 % | BODY MASS INDEX: 21.98 KG/M2

## 2018-05-24 DIAGNOSIS — K51.90 ULCERATIVE COLITIS WITHOUT COMPLICATIONS, UNSPECIFIED LOCATION (HCC): ICD-10-CM

## 2018-05-24 DIAGNOSIS — G43.109 MIGRAINE WITH AURA AND WITHOUT STATUS MIGRAINOSUS, NOT INTRACTABLE: Primary | ICD-10-CM

## 2018-05-24 DIAGNOSIS — I10 ESSENTIAL HYPERTENSION: ICD-10-CM

## 2018-05-24 DIAGNOSIS — R73.09 ELEVATED GLUCOSE: ICD-10-CM

## 2018-05-24 RX ORDER — CEFUROXIME AXETIL 250 MG/1
TABLET ORAL
Qty: 1 KIT | Refills: 3 | Status: SHIPPED | OUTPATIENT
Start: 2018-05-24 | End: 2018-08-20 | Stop reason: SDUPTHER

## 2018-05-24 RX ORDER — LISINOPRIL 20 MG/1
TABLET ORAL
Qty: 90 TAB | Refills: 1 | Status: SHIPPED | OUTPATIENT
Start: 2018-05-24 | End: 2019-01-10 | Stop reason: SDUPTHER

## 2018-05-24 NOTE — PROGRESS NOTES
Subjective:     HPI:  Michael Johnson is a 58 y.o. female who presents to the office for routine care and medication refills. Hypertension  The patient presents for follow up of hypertension. Pt's BP is 132/80 in the office today. Cardiovascular ROS: taking medications as instructed, no medication side effects noted, no TIA's, no chest pain on exertion, no dyspnea on exertion, no swelling of ankles. Allergies: Pt reports dealing with environmental allergies. Migraines: Pt reports she uses sumatriptan for migraines with good relief. Ulcerative colitis: Pt reports her ulcerative colitis is well controlled. She regularly follows up with GI. She last visited with Dr. Boone Villarreal, GI,  in November 2017. Current Outpatient Prescriptions   Medication Sig Dispense Refill    lisinopril (PRINIVIL, ZESTRIL) 20 mg tablet TAKE 1 TAB BY MOUTH DAILY. 90 Tab 1    SUMAtriptan succinate 6 mg/0.5 mL kit 6 MG BY SUBCUTANEOUS ROUTE ONCE AS NEEDED FOR MIGRAINE. 1 Kit 3    traZODone (DESYREL) 100 mg tablet TAKE 1 TABLET BY MOUTH NIGHTLY 90 Tab 1    multivitamin (ONE A DAY) tablet Take 1 Tab by mouth daily.  balsalazide (COLAZAL) 750 mg capsule Take 2,250 mg by mouth three (3) times daily. No Known Allergies    Past Medical History:   Diagnosis Date    Colitis, chronic, ulcerative ([address] 2002    Hypertension     Mesenteric ischemia (HCC)     Migraine         Past Surgical History:   Procedure Laterality Date    COLONOSCOPY N/A 4/7/2017    COL[facility] performed [facility] Maya Cary MD at [facility] ENDOSCOPY    HX CHOLECYSTECTOMY  10/29/2014       Family History   Problem Relation Age of Onset    Cancer Mother     Heart Disease Father     Parkinson's Disease Father     Breast Cancer Maternal Aunt        Social History     Social History    Marital status:      Spouse name: N/A    Number of children: N/A    Years of education: N/A     Occupational History    Not on file.      Social History Main Topics    Smoking status: Former Smoker     Types: Cigarettes     Quit date: 5/2/2014    Smokeless tobacco: Never Used      Comment: patient uses vapor cigarettes/ ecigarettes    Alcohol use 0.0 oz/week     0 Standard drinks or equivalent per week      Comment: rarely    Drug use: No    Sexual activity: Not Currently     Other Topics Concern    Not on file     Social History Narrative       REVIEW OF SYSTEM:  Review of Systems   Constitutional: Negative for chills and fever. Eyes: Negative for blurred vision. Respiratory: Negative for shortness of breath. Cardiovascular: Negative for chest pain, palpitations and leg swelling. Gastrointestinal: Negative for constipation, diarrhea, nausea and vomiting. Musculoskeletal: Negative for joint pain. Neurological: Negative for headaches. Objective:     Visit Vitals    /80 (BP 1 Location: Right arm, BP Patient Position: Sitting)    Pulse 88    Temp 96.3 °F (35.7 °C) (Oral)    Resp 16    Ht 5' 8\" (1.727 m)    Wt 145 lb (65.8 kg)    SpO2 98%    BMI 22.05 kg/m2       PHYSICAL EXAM:  Physical Exam   Constitutional: She is oriented to person, place, and time and well-developed, well-nourished, and in no distress. HENT:   Right Ear: Tympanic membrane, external ear and ear canal normal.   Left Ear: Tympanic membrane, external ear and ear canal normal.   Nose: Nose normal.   Mouth/Throat: Oropharynx is clear and moist.   Eyes: Pupils are equal, round, and reactive to light. Neck: Normal range of motion. Neck supple. No thyromegaly present. Cardiovascular: Normal rate, regular rhythm, normal heart sounds and intact distal pulses. No murmur heard. Pulmonary/Chest: Effort normal and breath sounds normal. She has no wheezes. Neurological: She is alert and oriented to person, place, and time. GCS score is 15. Skin: Skin is warm and dry. Vitals reviewed. Assessment/Plan:       ICD-10-CM ICD-9-CM    1.  Migraine with aura and without status migrainosus, not intractable G43.109 346.00 SUMAtriptan succinate 6 mg/0.5 mL kit   2. Essential hypertension I10 401.9 lisinopril (PRINIVIL, ZESTRIL) 20 mg tablet      LIPID PANEL      METABOLIC PANEL, COMPREHENSIVE   3. Elevated glucose R73.09 790.29 HEMOGLOBIN A1C WITH EAG   4. Ulcerative colitis without complications, unspecified location [facility]) K51.90 556.9      Patient given opportunity to ask questions. Questions answered. Labs usually completed by [email or web address], will need to call [facility] for results. Patient understands plan of care. Follow-up Disposition: Not on File    Written by Keenan Avila, as dictated by Bryn Joseph DO.    I, Dr. Bryn Joseph, confirm that all documentation is accurate.

## 2018-05-24 NOTE — PROGRESS NOTES
1. Have you been to the ER, urgent care clinic since your last visit? Hospitalized since your last visit? No    2. Have you seen or consulted any other health care providers outside of the [facility] since your last visit? Include any pap smears or colon screening. Loly Aguilar November 2017    Patient presents in office today for routine care. Patient concerns: due for lab work, med refills.

## 2018-05-24 NOTE — MR AVS SNAPSHOT
[address] [place] [id number] 
[phone] Patient: Yamilet Jimenez MRN: [id number] Visit Information Date & Time Provider Department Dept. Phone Encounter #  
 5/24/2018  5:20 PM Mario Hendrix, [address] [place] [address] [phone] [id number] Upcoming Health Maintenance Date Due DTaP/Tdap/Td series (1 - Tdap) 6/13/1976 BREAST CANCER SCRN MAMMOGRAM 12/15/2017 Influenza Age 5 to Adult 8/1/2018 PAP AKA CERVICAL CYTOLOGY 12/11/2018 COLONOSCOPY 4/7/2019 Allergies as of 5/24/2018  Review Complete On: 5/24/2018 By: Mario Hendrix, DO No Known Allergies Current Immunizations  Never Reviewed Name Date Influenza Vaccine (Quad) PF 9/15/2017, 11/15/2016, 11/4/2015 Zoster Vaccine, Live 12/8/2015 Not reviewed this visit You Were Diagnosed With   
  
 Codes Comments Migraine with aura and without status migrainosus, not intractable    -  Primary ICD-10-CM: G43.109 ICD-9-CM: 346.00 Essential hypertension     ICD-10-CM: I10 
ICD-9-CM: 401.9 Elevated glucose     ICD-10-CM: R73.09 
ICD-9-CM: 790.29 Ulcerative colitis without complications, unspecified location [facility])     ICD-10-CM: K51.90 ICD-9-CM: 462. 9 Vitals BP Pulse Temp Resp Height(growth percentile) Weight(growth percentile) 132/80 (BP 1 Location: Right arm, BP Patient Position: Sitting) 88 96.3 °F (35.7 °C) (Oral) 16 5' 8\" (1.727 m) 145 lb (65.8 kg) SpO2 BMI OB Status Smoking Status 98% 22.05 kg/m2 Postmenopausal Former Smoker BMI and BSA Data Body Mass Index Body Surface Area 22.05 kg/m 2 1.78 m 2 Preferred Pharmacy Pharmacy Name Phone [id number] [address] [phone] Your Updated Medication List  
  
   
This list is accurate as of 5/24/18  6:04 PM.  Always use your most recent med list.  
  
  
  
  
 balsalazide 750 mg capsule Commonly known as:  Hortencia Petr Take 2,250 mg by mouth three (3) times daily. lisinopril 20 mg tablet Commonly known as:  PRINIVIL, ZESTRIL  
TAKE 1 TAB BY MOUTH DAILY. multivitamin tablet Commonly known as:  ONE A DAY Take 1 Tab by mouth daily. SUMAtriptan succinate 6 mg/0.5 mL kit 6 MG BY SUBCUTANEOUS ROUTE ONCE AS NEEDED FOR MIGRAINE.  
  
 traZODone 100 mg tablet Commonly known as:  DESYREL  
TAKE 1 TABLET BY MOUTH NIGHTLY Prescriptions Sent to Pharmacy Refills  
 lisinopril (PRINIVIL, ZESTRIL) 20 mg tablet 1 Sig: TAKE 1 TAB BY MOUTH DAILY. Class: Normal  
 Pharmacy: [address] Ph #: [phone] SUMAtriptan succinate 6 mg/0.5 mL kit 3 Si MG BY SUBCUTANEOUS ROUTE ONCE AS NEEDED FOR MIGRAINE. Class: Normal  
 Pharmacy: [address] Ph #: [phone] To-Do List   
 2018 Lab:  HEMOGLOBIN A1C WITH EAG   
  
 2018 Lab:  LIPID PANEL   
  
 2018 Lab:  METABOLIC PANEL, COMPREHENSIVE [facility]! Dear Daya De Jesus: Thank you for requesting a [employer] account. Our records indicate that you already have an active [employer] account. You can access your account anytime at [email or web address] [employer] Did you know that you can access your hospital and ER discharge instructions at any time in [employer]? You can also review all of your test results from your hospital stay or ER visit. Additional Information If you have questions, please visit the Frequently Asked Questions section of the [employer] website at [email or web address] Remember, [employer] is NOT to be used for urgent needs. For medical emergencies, dial 911. Now available from your iPhone and Android! Please provide this summary of care documentation to your next provider. Your primary care clinician is listed as Luis Schumacher. If you have any questions after today's visit, please call [phone].

## 2018-06-07 ENCOUNTER — DOCUMENTATION ONLY (OUTPATIENT)
Dept: FAMILY MEDICINE CLINIC | Age: 63
End: 2018-06-07

## 2018-06-07 NOTE — PROGRESS NOTES
Patient returning call  Message relayed per [facility] REILLY ANDINO. LPN.  Patient verbalized understanding and call was ended

## 2018-08-20 DIAGNOSIS — G43.109 MIGRAINE WITH AURA AND WITHOUT STATUS MIGRAINOSUS, NOT INTRACTABLE: ICD-10-CM

## 2018-09-04 RX ORDER — CEFUROXIME AXETIL 250 MG/1
TABLET ORAL
Qty: 1 KIT | Refills: 5 | Status: SHIPPED | OUTPATIENT
Start: 2018-09-04 | End: 2019-03-21 | Stop reason: SDUPTHER

## 2018-10-24 ENCOUNTER — OFFICE VISIT (OUTPATIENT)
Dept: FAMILY MEDICINE CLINIC | Age: 63
End: 2018-10-24

## 2018-10-24 VITALS
TEMPERATURE: 98.3 F | RESPIRATION RATE: 18 BRPM | DIASTOLIC BLOOD PRESSURE: 83 MMHG | HEIGHT: 68 IN | WEIGHT: 152 LBS | SYSTOLIC BLOOD PRESSURE: 148 MMHG | BODY MASS INDEX: 23.04 KG/M2 | HEART RATE: 87 BPM | OXYGEN SATURATION: 97 %

## 2018-10-24 DIAGNOSIS — R94.31 ABNORMAL EKG: ICD-10-CM

## 2018-10-24 DIAGNOSIS — J44.9 CHRONIC OBSTRUCTIVE PULMONARY DISEASE, UNSPECIFIED COPD TYPE (HCC): ICD-10-CM

## 2018-10-24 DIAGNOSIS — J01.00 SUBACUTE MAXILLARY SINUSITIS: ICD-10-CM

## 2018-10-24 DIAGNOSIS — Z00.00 ANNUAL PHYSICAL EXAM: Primary | ICD-10-CM

## 2018-10-24 DIAGNOSIS — M25.552 PAIN OF LEFT HIP JOINT: ICD-10-CM

## 2018-10-24 DIAGNOSIS — R06.00 DYSPNEA, UNSPECIFIED TYPE: ICD-10-CM

## 2018-10-24 DIAGNOSIS — Z23 ENCOUNTER FOR IMMUNIZATION: ICD-10-CM

## 2018-10-24 DIAGNOSIS — M81.8 OTHER OSTEOPOROSIS WITHOUT CURRENT PATHOLOGICAL FRACTURE: ICD-10-CM

## 2018-10-24 RX ORDER — IBANDRONATE SODIUM 150 MG/1
150 TABLET, FILM COATED ORAL
Qty: 3 TAB | Refills: 2 | Status: SHIPPED | OUTPATIENT
Start: 2018-10-24

## 2018-10-24 RX ORDER — AZITHROMYCIN 250 MG/1
TABLET, FILM COATED ORAL
Qty: 6 TAB | Refills: 0 | Status: SHIPPED | OUTPATIENT
Start: 2018-10-24 | End: 2018-10-29

## 2018-10-24 RX ORDER — ALBUTEROL SULFATE 90 UG/1
2 AEROSOL, METERED RESPIRATORY (INHALATION)
Qty: 1 INHALER | Refills: 12 | Status: SHIPPED | OUTPATIENT
Start: 2018-10-24

## 2018-10-24 NOTE — PROGRESS NOTES
Chief Complaint Patient presents with  Complete Physical  
 Immunization/Injection 1. Have you been to the ER, urgent care clinic since your last visit? Hospitalized since your last visit? No 
 
2. Have you seen or consulted any other health care providers outside of the [address] [place] [address] since your last visit? Include any pap smears or colon screening. No 
 
After obtaining consent, and per orders of Dr. Lyssa Galeas, injection of the flu vaccine given by Saeed Mora LPN. Patient instructed to remain in clinic for 20 minutes afterwards, and to report any adverse reaction to me immediately.

## 2018-10-24 NOTE — PROGRESS NOTES
Aziza Salcido is a 61 y.o.  female and presents with Chief Complaint Patient presents with  Complete Physical  
 Immunization/Injection  COPD  
 Osteoporosis  Hypertension  Sinus Infection Pt has symptoms of sinus infection She says during the recent hurricaine she tried to run and she got SOB and some chest tightness. Pos FH for CAD. Pt does smoke cigarettes. Pt does have emphysema but is not aware of it. Pt is not taking anything for osteoporosis. Past Medical History:  
Diagnosis Date  Colitis, chronic, ulcerative ([address] 2002  Hypertension  Mesenteric ischemia (HCC)  Migraine Past Surgical History:  
Procedure Laterality Date  HX CHOLECYSTECTOMY  10/29/2014 Current Outpatient Medications Medication Sig  psyllium husk (METAMUCIL PO) Take  by mouth.  ibandronate (BONIVA) 150 mg tablet Take 150 mg by mouth every thirty (30) days.  azithromycin (ZITHROMAX) 250 mg tablet Take 2 tablets today, then take 1 tablet daily  albuterol (PROVENTIL HFA, VENTOLIN HFA, PROAIR HFA) 90 mcg/actuation inhaler Take 2 Puffs by inhalation every six (6) hours as needed for Wheezing or Shortness of Breath.  SUMAtriptan succinate 6 mg/0.5 mL kit INJECT 1 PEN BY SUBCUTANEOUS ROUTE ONCE AS NEEDED FOR MIGRAINE.  lisinopril (PRINIVIL, ZESTRIL) 20 mg tablet TAKE 1 TAB BY MOUTH DAILY.  traZODone (DESYREL) 100 mg tablet TAKE 1 TABLET BY MOUTH NIGHTLY  multivitamin (ONE A DAY) tablet Take 1 Tab by mouth daily.  balsalazide (COLAZAL) 750 mg capsule Take 2,250 mg by mouth three (3) times daily. No current facility-administered medications for this visit. Health Maintenance Topic Date Due  
 DTaP/Tdap/Td series (1 - Tdap) 06/13/1976  Shingrix Vaccine Age 50> (1 of 2) 06/13/2005  BREAST CANCER SCRN MAMMOGRAM  12/15/2017  Influenza Age 5 to Adult  08/01/2018  PAP AKA CERVICAL CYTOLOGY  12/11/2018  COLONOSCOPY  04/07/2019  Hepatitis C Screening  Completed  Bone Densitometry (Dexa) Screening  Completed Immunization History Administered Date(s) Administered  Influenza Vaccine (Quad) PF 11/04/2015, 11/15/2016, 09/15/2017, 10/24/2018  Zoster Vaccine, Live 12/08/2015 No LMP recorded. Patient is postmenopausal. 
 
 
 
Allergies and Intolerances:  
No Known Allergies Family History:  
Family History Problem Relation Age of Onset  Cancer Mother  Heart Disease Father  Parkinson's Disease Father  Breast Cancer Maternal Aunt Social History: She  reports that she quit smoking about 4 years ago. Her smoking use included cigarettes. she has never used smokeless tobacco.  She  reports that she does not drink alcohol. Review of Systems:  
General: negative for - chills, fatigue, fever, weight change Psych: negative for - anxiety, depression, irritability or mood swings ENT: negative for - headaches, hearing change, nasal congestion, oral lesions, sneezing or sore throat Heme/ Lymph: negative for - bleeding problems, bruising, pallor or swollen lymph nodes Endo: negative for - hot flashes, polydipsia/polyuria or temperature intolerance Resp: negative for - cough, shortness of breath or wheezing CV: negative for - chest pain, edema or palpitations GI: negative for - abdominal pain, change in bowel habits, constipation, diarrhea or nausea/vomiting : negative for - dysuria, hematuria, incontinence, pelvic pain or vulvar/vaginal symptoms MSK: negative for - joint pain, joint swelling or muscle pain Neuro: negative for - confusion, headaches, seizures or weakness Derm: negative for - dry skin, hair changes, rash or skin lesion changes Physical:  
Vitals:  
Vitals:  
 10/24/18 1438 10/24/18 1638 BP: 161/83 148/83 Pulse: 87 87 Resp: 18 Temp: 98.3 °F (36.8 °C) TempSrc: Oral   
SpO2: 97% Weight: 152 lb (68.9 kg) Height: 5' 8\" (1.727 m) Exam: HEENT- atraumatic,normocephalic, awake, oriented, well nourished Neck - supple,no enlarged lymph nodes, no JVD, no thyromegaly Chest- CTA, no rhonchi, no crackles Heart- rrr, no murmurs / gallop/rub Abdomen- soft,BS+,NT, no hepatosplenomegaly Ext - no c/c/edema Neuro- no focal deficits. Power 5/5 all extremities Skin - warm,dry, no obvious rashes. Review of Data:  
LABS:  
Lab Results Component Value Date/Time WBC 7.5 12/08/2015 12:00 PM  
 HGB 13.8 12/08/2015 12:00 PM  
 HCT 42.1 12/08/2015 12:00 PM  
 PLATELET 651 51/56/4530 12:00 PM  
 
Lab Results Component Value Date/Time Sodium 142 12/08/2015 12:00 PM  
 Potassium 3.9 12/08/2015 12:00 PM  
 Chloride 103 12/08/2015 12:00 PM  
 CO2 31 12/08/2015 12:00 PM  
 Glucose 92 12/08/2015 12:00 PM  
 BUN 13 12/08/2015 12:00 PM  
 Creatinine 0.78 12/08/2015 12:00 PM  
 
Lab Results Component Value Date/Time Cholesterol, total 161 12/08/2015 12:00 PM  
 HDL Cholesterol 51 12/08/2015 12:00 PM  
 LDL, calculated 83.8 12/08/2015 12:00 PM  
 Triglyceride 131 12/08/2015 12:00 PM  
 
No results found for: GPT Impression / Plan: ICD-10-CM ICD-9-CM 1. Annual physical exam Z00.00 V70.0 AMB POC SPIROMETRY W/O BRONCHODILATOR AMB POC EKG ROUTINE W/ 12 LEADS, INTER & REP 2. Chronic obstructive pulmonary disease, unspecified COPD type (HCC) J44.9 496 AMB POC SPIROMETRY W/O BRONCHODILATOR  
   albuterol (PROVENTIL HFA, VENTOLIN HFA, PROAIR HFA) 90 mcg/actuation inhaler 3. Other osteoporosis without current pathological fracture M81.8 733.09 ibandronate (BONIVA) 150 mg tablet 4. Pain of left hip joint M25.552 719.45 XR HIP LT W OR WO PELV 2-3 VWS 5. Subacute maxillary sinusitis J01.00 461.0 azithromycin (ZITHROMAX) 250 mg tablet 6. Encounter for immunization Z23 V03.89 INFLUENZA VIRUS VAC QUAD,SPLIT,PRESV FREE SYRINGE IM  
7.  Dyspnea, unspecified type R06.00 786.09 albuterol (PROVENTIL HFA, VENTOLIN HFA, PROAIR HFA) 90 mcg/actuation inhaler 8. Abnormal EKG R94.31 794.31 REFERRAL TO CARDIOLOGY Explained to patient risk benefits of the medications. Advised patient to stop meds if having any side effects. Pt verbalized understanding of the instructions. I have discussed the diagnosis with the patient and the intended plan as seen in the above orders. The patient has received an after-visit summary and questions were answered concerning future plans. I have discussed medication side effects and warnings with the patient as well. I have reviewed the plan of care with the patient, accepted their input and they are in agreement with the treatment goals. Reviewed plan of care. Patient has provided input and agrees with goals. Follow-up Disposition: 
Return in about 3 months (around 1/24/2019).  
 
Corry Petty MD

## 2018-10-25 ENCOUNTER — TELEPHONE (OUTPATIENT)
Dept: FAMILY MEDICINE CLINIC | Age: 63
End: 2018-10-25

## 2018-10-25 NOTE — TELEPHONE ENCOUNTER
Pt. Is asking if she needs to get her calcium levels checked before taking ibandronate (BONIVA) 150 mg tablet. Please advise.

## 2018-11-02 ENCOUNTER — TELEPHONE (OUTPATIENT)
Dept: FAMILY MEDICINE CLINIC | Age: 63
End: 2018-11-02

## 2018-11-02 NOTE — TELEPHONE ENCOUNTER
Dr. Stuart Chu prescribed arithromycin. Pt. Is requesting another prescription, she stated she is still sick. Pt. Was last seen on 10/24/18.  Please assist.

## 2018-11-16 ENCOUNTER — HOSPITAL ENCOUNTER (OUTPATIENT)
Dept: GENERAL RADIOLOGY | Age: 63
Discharge: HOME | End: 2018-11-16
Payer: OTHER GOVERNMENT

## 2018-11-16 ENCOUNTER — OFFICE VISIT (OUTPATIENT)
Dept: CARDIOLOGY CLINIC | Age: 63
End: 2018-11-16

## 2018-11-16 VITALS
OXYGEN SATURATION: 94 % | BODY MASS INDEX: 23.04 KG/M2 | DIASTOLIC BLOOD PRESSURE: 70 MMHG | SYSTOLIC BLOOD PRESSURE: 133 MMHG | HEART RATE: 83 BPM | WEIGHT: 152 LBS | HEIGHT: 68 IN

## 2018-11-16 DIAGNOSIS — M25.552 PAIN OF LEFT HIP JOINT: ICD-10-CM

## 2018-11-16 DIAGNOSIS — R94.31 ABNORMAL EKG: ICD-10-CM

## 2018-11-16 DIAGNOSIS — R07.9 CHEST PAIN, UNSPECIFIED TYPE: ICD-10-CM

## 2018-11-16 DIAGNOSIS — I10 HYPERTENSION, UNSPECIFIED TYPE: Primary | ICD-10-CM

## 2018-11-16 PROCEDURE — 73502 X-RAY EXAM HIP UNI 2-3 VIEWS: CPT

## 2018-11-16 NOTE — PROGRESS NOTES
1. Have you been to the ER, urgent care clinic since your last visit? Hospitalized since your last visit? No    2. Have you seen or consulted any other health care providers outside of the [address] [place] [address] since your last visit? Include any pap smears or colon screening.  No

## 2018-11-16 NOTE — TELEPHONE ENCOUNTER
Pt is checking status of medication refill. Advised that she may need to make an appt. Please advise.

## 2018-11-16 NOTE — PATIENT INSTRUCTIONS
Echo and Nuclear Stress test-[facility] will call to schedule your testing within 24-48 hours. If you do not hear from her, then please call her directly at [phone].     All testing/lab work is completed in [address] [place] [address] 150   at [facility]

## 2018-11-16 NOTE — PROGRESS NOTES
[facility]    Ms. Carlos Kim is a 61year old female with a history of hypertension, tobacco abuse disorder, ulcerative colitis. Ms. Carlos Kim was asked to come see me for the evaluation of abnormal EKG. Ms. Carlos Kim states she has a history of hypertension, ulcerative colitis and ongoing tobacco abuse disorder. She was diagnosed with COPD by primary care provider. Because of abnormal EKG she was asked to come see me. Ms. Carlos Kim states she never had a myocardial infarction or congestive heart failure. Approximately for the last three months she has noticed some exertional dyspnea. She had not noticed that before. She said probably she could walk  yards and then she feels short of breath, especially with fast paced walking. At home when she climbs one flight of stairs, especially with a laundry basket, she does feel short of breath, which resolves itself. She does use one pillow at night. She denies lower extremity swelling. She denies chest pain or chest tightness. She does not smoke cigarettes, however she uses nicotine in the form of electronic cigarette. She denies any presyncope or syncope. Denies any nausea, vomiting, abdominal pain, fever, chills, sputum production. No hematuria or other bleeding complaints    Past Medical History:   Diagnosis Date    Colitis, chronic, ulcerative ([facility] 2002    Hypertension     Mesenteric ischemia ([facility]     Migraine          Past Surgical History:   Procedure Laterality Date    HX CHOLECYSTECTOMY  10/29/2014       Current Outpatient Medications   Medication Sig    ubidecarenone (COQ-10 PO) Take  by mouth.  calcium-cholecalciferol, d3, (CALCIUM 600 + D) 600-125 mg-unit tab Take  by mouth.  psyllium husk (METAMUCIL PO) Take  by mouth.     albuterol (PROVENTIL HFA, VENTOLIN HFA, PROAIR HFA) 90 mcg/actuation inhaler Take 2 Puffs by inhalation every six (6) hours as needed for Wheezing or Shortness of Breath.  SUMAtriptan succinate 6 mg/0.5 mL kit INJECT 1 PEN BY SUBCUTANEOUS ROUTE ONCE AS NEEDED FOR MIGRAINE.  lisinopril (PRINIVIL, ZESTRIL) 20 mg tablet TAKE 1 TAB BY MOUTH DAILY.  traZODone (DESYREL) 100 mg tablet TAKE 1 TABLET BY MOUTH NIGHTLY    multivitamin (ONE A DAY) tablet Take 1 Tab by mouth daily.  balsalazide (COLAZAL) 750 mg capsule Take 2,250 mg by mouth three (3) times daily.  ibandronate (BONIVA) 150 mg tablet Take 150 mg by mouth every thirty (30) days. No current facility-administered medications for this visit. Allergies and Sensitivities:  No Known Allergies    Family History:  Family History   Problem Relation Age of Onset    Cancer Mother     Heart Disease Father     Parkinson's Disease Father     Breast Cancer Maternal Aunt        Social History:  Social History     Tobacco Use    Smoking status: Former Smoker     Types: Cigarettes     Last attempt to quit: 2014     Years since quittin.5    Smokeless tobacco: Never Used    Tobacco comment: patient uses vapor cigarettes/ ecigarettes   Substance Use Topics    Alcohol use: No     Alcohol/week: 0.0 oz     Frequency: Never    Drug use: No     She  reports that she quit smoking about 4 years ago. Her smoking use included cigarettes. she has never used smokeless tobacco.  She  reports that she does not drink alcohol. Review of Systems:  Cardiac symptoms as noted above in HPI. All others negative. Denies fatigue, malaise, skin rash, joint pain, blurring vision, photophobia, neck pain, hemoptysis, chronic cough, nausea, vomiting, hematuria, burning micturition, BRBPR, chronic headaches.     Physical Exam:  BP Readings from Last 3 Encounters:   18 133/70   10/24/18 148/83   18 132/80         Pulse Readings from Last 3 Encounters:   18 83   10/24/18 87   18 88          Wt Readings from Last 3 Encounters:   18 152 lb (68.9 kg)   10/24/18 152 lb (68.9 kg) 05/24/18 145 lb (65.8 kg)       Constitutional: Oriented to person, place, and time. HENT: Head: Normocephalic and atraumatic. Eyes: Conjunctivae and extraocular motions are normal.   Neck: No JVD present. Carotid bruit is not appreciated. Cardiovascular: Regular rhythm. No murmur, gallop or rubs appreciated  Lung: Breath sounds normal. No respiratory distress. No ronchi or rales appreciated  Abdominal: No tenderness. No rebound and no guarding. Musculoskeletal: There is no lower extremity edema. No cynosis  Lymphadenopathy:  No cervical or supraclavicular adenopathy appriciated. Neurological: No gross motor deficit noted. Skin: No visible skin rash noted. No Ear discharge noted  Psychiatric: Normal mood and affect. Good distal pulse    Review of Data  LABS:   Lab Results   Component Value Date/Time    Sodium 142 12/08/2015 12:00 PM    Potassium 3.9 12/08/2015 12:00 PM    Chloride 103 12/08/2015 12:00 PM    CO2 31 12/08/2015 12:00 PM    Glucose 92 12/08/2015 12:00 PM    BUN 13 12/08/2015 12:00 PM    Creatinine 0.78 12/08/2015 12:00 PM     Lipids Latest Ref Rng & Units 12/8/2015   Chol, Total <200 MG/   HDL 40 - 60 MG/DL 51   LDL 0 - 100 MG/DL 83.8   Trig <150 MG/   Chol/HDL Ratio 0 - 5.0   3.2   Some recent data might be hidden     Lab Results   Component Value Date/Time    ALT (SGPT) 18 12/08/2015 12:00 PM     No results found for: HBA1C, HGBE8, XNT1LYJB, JNJ2SSSQ    EKG  (11/18) SInus rhythm. Non-specific ST-T inversion in precordial leads. ECHO    STRESS TEST (EST, PHARM, NUC, ECHO etc)    CATHETERIZATION    IMPRESSION & PLAN:  Ms. Solange Bales is a 61year old female with a history of hypertension, ulcerative colitis and tobacco abuse disorder. Dyspnea:  Ms. Solaneg Bales has been experiencing some exertional dyspnea, which she noticed for the last three months as mentioned in HPI. She also has a longstanding history of tobacco abuse disorder.  It certainly could be underlying COPD and emphysema, however she also has significant risk factors to have underlying coronary artery disease. Her age, postmenopausal state, hypertension and very strong family history of premature coronary artery disease in the father when he had a CABG in his 45s. Considering the abnormal EKG, her recently noticed exertional dyspnea and her excessive risk factors, I believe she should undergo nuclear stress test for risk stratification to rule out any ischemia. An echocardiogram will be ordered to rule out hypertensive cardiovascular heart disease. I have asked her to take over the counter aspirin. I have advised her to avoid any exertional activities until the stress test is done. Hypertension:  Her blood pressure today is 133/70 mmHg. She is on Lisinopril 20 mg daily. I am going to order echocardiogram to rule out hypertensive cardiovascular heart disease. Ulcerative colitis:  I am going to defer this management to GI team.  Currently she denies any flare-up of her ulcerative colitis. Abnormal EKG: Ms. Beau Gonzalez has an abnormal EKG. It showed nonspecific ST-T inversion in precordial leads. An echocardiogram and stress test has been ordered as mentioned above in the presence of symptoms. This plan was discussed with patient who is in agreement. Thank you for allowing me to participate in patient care. Please feel free to call me if you have any question or concern. Liza Wood MD  Please note: This document has been produced using voice recognition software. Unrecognized errors in transcription may be present.

## 2018-11-18 RX ORDER — TRAZODONE HYDROCHLORIDE 100 MG/1
TABLET ORAL
Qty: 90 TAB | Refills: 1 | Status: SHIPPED | OUTPATIENT
Start: 2018-11-18 | End: 2019-05-03 | Stop reason: SDUPTHER

## 2018-11-29 ENCOUNTER — HOSPITAL ENCOUNTER (OUTPATIENT)
Dept: NON INVASIVE DIAGNOSTICS | Age: 63
Discharge: HOME | End: 2018-11-29
Attending: INTERNAL MEDICINE
Payer: OTHER GOVERNMENT

## 2018-11-29 ENCOUNTER — HOSPITAL ENCOUNTER (OUTPATIENT)
Dept: NUCLEAR MEDICINE | Age: 63
Discharge: HOME | End: 2018-11-29
Attending: INTERNAL MEDICINE
Payer: OTHER GOVERNMENT

## 2018-11-29 VITALS
SYSTOLIC BLOOD PRESSURE: 135 MMHG | DIASTOLIC BLOOD PRESSURE: 64 MMHG | BODY MASS INDEX: 23.04 KG/M2 | WEIGHT: 152 LBS | HEIGHT: 68 IN

## 2018-11-29 DIAGNOSIS — R94.31 ABNORMAL EKG: ICD-10-CM

## 2018-11-29 DIAGNOSIS — R07.9 CHEST PAIN, UNSPECIFIED TYPE: ICD-10-CM

## 2018-11-29 DIAGNOSIS — I10 HYPERTENSION, UNSPECIFIED TYPE: ICD-10-CM

## 2018-11-29 LAB
STRESS ANGINA INDEX: 0
STRESS BASELINE HR: 78 BPM
STRESS ESTIMATED WORKLOAD: 7 METS
STRESS EXERCISE DUR MIN: NORMAL
STRESS PEAK DIAS BP: 70 MMHG
STRESS PEAK SYS BP: 164 MMHG
STRESS PERCENT HR ACHIEVED: 112 %
STRESS POST PEAK HR: 150 BPM
STRESS RATE PRESSURE PRODUCT: NORMAL BPM*MMHG
STRESS SR DUKE TREADMILL SCORE: 0
STRESS ST DEPRESSION: 0 MM
STRESS ST ELEVATION: 0 MM
STRESS TARGET HR: 133 BPM

## 2018-11-29 PROCEDURE — 93017 CV STRESS TEST TRACING ONLY: CPT

## 2018-11-29 PROCEDURE — A9500 TC99M SESTAMIBI: HCPCS

## 2018-11-29 PROCEDURE — 93306 TTE W/DOPPLER COMPLETE: CPT

## 2018-12-18 ENCOUNTER — OFFICE VISIT (OUTPATIENT)
Dept: CARDIOLOGY CLINIC | Age: 63
End: 2018-12-18

## 2018-12-18 VITALS
BODY MASS INDEX: 23.26 KG/M2 | DIASTOLIC BLOOD PRESSURE: 76 MMHG | OXYGEN SATURATION: 97 % | WEIGHT: 153 LBS | HEART RATE: 88 BPM | SYSTOLIC BLOOD PRESSURE: 120 MMHG

## 2018-12-18 DIAGNOSIS — R94.31 ABNORMAL EKG: ICD-10-CM

## 2018-12-18 DIAGNOSIS — I10 HYPERTENSION, UNSPECIFIED TYPE: Primary | ICD-10-CM

## 2018-12-18 DIAGNOSIS — R06.00 DYSPNEA, UNSPECIFIED TYPE: ICD-10-CM

## 2018-12-18 NOTE — PROGRESS NOTES
[facility]    Ms. Solange Bales is a 61 y.o. female with a history of hypertension, tobacco abuse disorder, ulcerative colitis. Ms. Solange Bales is here today for follow up. No angina or chest tightness  No PND, LE edema, presyncope or syncope  No new symptoms. Denies any nausea, vomiting, abdominal pain, fever, chills, sputum production. No hematuria or other bleeding complaints    Past Medical History:   Diagnosis Date    Hypertension     Mesenteric ischemia ([facility] [address]     Migraine     Tobacco abuse     Ulcerative colitis ([facility] [address] 2002         Past Surgical History:   Procedure Laterality Date    COLONOSCOPY N/A 4/7/2017    COLONOSCOPY performed by Jyotsna Keller MD at [address] [facility]  10/29/2014       Current Outpatient Medications   Medication Sig    traZODone (DESYREL) 100 mg tablet TAKE 1 TABLET BY MOUTH NIGHTLY    ubidecarenone (COQ-10 PO) Take  by mouth.  calcium-cholecalciferol, d3, (CALCIUM 600 + D) 600-125 mg-unit tab Take  by mouth.  psyllium husk (METAMUCIL PO) Take  by mouth.  ibandronate (BONIVA) 150 mg tablet Take 150 mg by mouth every thirty (30) days.  albuterol (PROVENTIL HFA, VENTOLIN HFA, PROAIR HFA) 90 mcg/actuation inhaler Take 2 Puffs by inhalation every six (6) hours as needed for Wheezing or Shortness of Breath.  SUMAtriptan succinate 6 mg/0.5 mL kit INJECT 1 PEN BY SUBCUTANEOUS ROUTE ONCE AS NEEDED FOR MIGRAINE.  lisinopril (PRINIVIL, ZESTRIL) 20 mg tablet TAKE 1 TAB BY MOUTH DAILY.  multivitamin (ONE A DAY) tablet Take 1 Tab by mouth daily.  balsalazide (COLAZAL) 750 mg capsule Take 2,250 mg by mouth three (3) times daily. No current facility-administered medications for this visit.         Allergies and Sensitivities:  No Known Allergies    Family History:  Family History   Problem Relation Age of Onset    Cancer Mother     Heart Disease Father     Parkinson's Disease Father     Breast Cancer Maternal Aunt        Social History:  Social History     Tobacco Use    Smoking status: Former Smoker     Types: Cigarettes     Last attempt to quit: 2014     Years since quittin.6    Smokeless tobacco: Never Used    Tobacco comment: patient uses vapor cigarettes/ ecigarettes   Substance Use Topics    Alcohol use: No     Alcohol/week: 0.0 oz     Frequency: Never    Drug use: No     She  reports that she quit smoking about 4 years ago. Her smoking use included cigarettes. she has never used smokeless tobacco.  She  reports that she does not drink alcohol. Review of Systems:  Cardiac symptoms as noted above in HPI. All others negative. Denies fatigue, malaise, skin rash, joint pain, blurring vision, photophobia, neck pain, hemoptysis, chronic cough, nausea, vomiting, hematuria, burning micturition, BRBPR, chronic headaches. Physical Exam:  BP Readings from Last 3 Encounters:   18 120/76   18 135/64   18 133/70         Pulse Readings from Last 3 Encounters:   18 88   18 83   10/24/18 87          Wt Readings from Last 3 Encounters:   18 153 lb (69.4 kg)   18 152 lb (68.9 kg)   18 152 lb (68.9 kg)       Constitutional: Oriented to person, place, and time. HENT: No LN enlargement  Cardiovascular: Regular rhythm. No murmur, gallop or rubs appreciated  Lung: Breath sounds normal. No respiratory distress. No ronchi or rales appreciated  Abdominal: No tenderness. No rebound and no guarding. Musculoskeletal: There is no lower extremity edema.  No cynosis      Review of Data  LABS:   Lab Results   Component Value Date/Time    Sodium 142 2015 12:00 PM    Potassium 3.9 2015 12:00 PM    Chloride 103 2015 12:00 PM    CO2 31 2015 12:00 PM    Glucose 92 2015 12:00 PM    BUN 13 2015 12:00 PM    Creatinine 0.78 2015 12:00 PM     Lipids Latest Ref Rng & Units 2015   Chol, Total <200 MG/   HDL 40 - 60 MG/DL 51   LDL 0 - 100 MG/DL 83.8   Trig <150 MG/   Chol/HDL Ratio 0 - 5.0   3.2   Some recent data might be hidden     Lab Results   Component Value Date/Time    ALT (SGPT) 18 12/08/2015 12:00 PM     No results found for: HBA1C, HGBE8, WJF6RRWA, OVO9FAUO, MBL2ZGDI    EKG  (11/18) SInus rhythm. Non-specific ST-T inversion in precordial leads. ECHO (11/18)  Left Ventricle Normal cavity size, wall thickness and systolic function (ejection fraction normal). The estimated ejection fraction is 61 - 65%. Visually measured ejection fraction. There is age-appropriate left ventricular diastolic function. Left Atrium Normal cavity size. Left Atrium volume index is 31 mL/m2. Right Ventricle Normal cavity size and global systolic function. Moderator band present. Right Atrium Normal size. Aortic Valve Normal, trileaflet aortic valve structure. No stenosis and no regurgitation. Mitral Valve Normal valve structure and no stenosis. Trace regurgitation. Tricuspid Valve Normal valve structure and no stenosis. Trace tricuspid valve regurgitation. Pulmonary arterial systolic pressure is 40 mmHg. There is no evidence of pulmonary hypertension. Pulmonic Valve The pulmonic valve was not well visualized. No stenosis and no regurgitation. Aorta Normal aortic root, ascending aortic, and aortic arch. IVC/Hepatic Veins Mildly elevated central venous pressure (5-10 mmHg); IVC diameter is less than 21 mm and collapses less than 50% with respiration. STRESS TEST (11/18)  · Baseline ECG: Normal sinus rhythm, non-specific ST-T wave abnormalities. · Negative stress electrocardiogram.  · Gated SPECT: Left ventricular function post-stress was normal. Calculated ejection fraction is 67%.   · Left ventricular perfusion is normal.  · Myocardial perfusion imaging defect 1: There is a defect that is small in size present in the basal inferoseptal location(s) that is more pronounced with rest than stress imaging. The defect appears to be an artifact caused by subdiaphragmatic activity. · There was no convincing evidence of significant reversible defect to suggest on going major ischemia. · Negative myocardial perfusion imaging. Myocardial perfusion imaging supports a low risk stress test.    CATHETERIZATION    IMPRESSION & PLAN:  Ms. Orlin Tomas is a 61 y.o. female with a history of hypertension, ulcerative colitis and tobacco abuse disorder. Dyspnea:    Probably from tobacco abuse disorder. Follow up with PCP for further care  ECHO and stress test, low risk in 11/18. NO evidence of fluid overload on exam.     Hypertension:  Her blood pressure today is 120/76 mmHg. She is on Lisinopril 20 mg daily. Ulcerative colitis:  I am going to defer this management to GI team.      This plan was discussed with patient who is in agreement. Thank you for allowing me to participate in patient care. Please feel free to call me if you have any question or concern. Kelsie Cid MD  Please note: This document has been produced using voice recognition software. Unrecognized errors in transcription may be present.

## 2019-01-10 DIAGNOSIS — I10 ESSENTIAL HYPERTENSION: ICD-10-CM

## 2019-01-14 DIAGNOSIS — I10 ESSENTIAL HYPERTENSION: ICD-10-CM

## 2019-01-17 RX ORDER — LISINOPRIL 20 MG/1
TABLET ORAL
Qty: 90 TAB | Refills: 1 | Status: SHIPPED | OUTPATIENT
Start: 2019-01-17 | End: 2019-03-12 | Stop reason: SDUPTHER

## 2019-01-17 RX ORDER — LISINOPRIL 20 MG/1
TABLET ORAL
Qty: 90 TAB | Refills: 1 | Status: SHIPPED | OUTPATIENT
Start: 2019-01-17

## 2019-03-12 ENCOUNTER — OFFICE VISIT (OUTPATIENT)
Dept: FAMILY MEDICINE CLINIC | Age: 64
End: 2019-03-12

## 2019-03-12 ENCOUNTER — TELEPHONE (OUTPATIENT)
Dept: FAMILY MEDICINE CLINIC | Age: 64
End: 2019-03-12

## 2019-03-12 VITALS
HEIGHT: 68 IN | DIASTOLIC BLOOD PRESSURE: 68 MMHG | WEIGHT: 154 LBS | TEMPERATURE: 98.3 F | HEART RATE: 85 BPM | RESPIRATION RATE: 16 BRPM | SYSTOLIC BLOOD PRESSURE: 137 MMHG | BODY MASS INDEX: 23.34 KG/M2 | OXYGEN SATURATION: 95 %

## 2019-03-12 DIAGNOSIS — J01.40 ACUTE NON-RECURRENT PANSINUSITIS: Primary | ICD-10-CM

## 2019-03-12 RX ORDER — AZITHROMYCIN 250 MG/1
TABLET, FILM COATED ORAL
Qty: 6 TAB | Refills: 0 | Status: SHIPPED | OUTPATIENT
Start: 2019-03-12 | End: 2019-03-17

## 2019-03-12 NOTE — PATIENT INSTRUCTIONS
Sinusitis: Care Instructions  Your Care Instructions    Sinusitis is an infection of the lining of the sinus cavities in your head. Sinusitis often follows a cold. It causes pain and pressure in your head and face. In most cases, sinusitis gets better on its own in 1 to 2 weeks. But some mild symptoms may last for several weeks. Sometimes antibiotics are needed. Follow-up care is a key part of your treatment and safety. Be sure to make and go to all appointments, and call your doctor if you are having problems. It's also a good idea to know your test results and keep a list of the medicines you take. How can you care for yourself at home? · Take an over-the-counter pain medicine, such as acetaminophen (Tylenol), ibuprofen (Advil, Motrin), or naproxen (Aleve). Read and follow all instructions on the label. · If the doctor prescribed antibiotics, take them as directed. Do not stop taking them just because you feel better. You need to take the full course of antibiotics. · Be careful when taking over-the-counter cold or flu medicines and Tylenol at the same time. Many of these medicines have acetaminophen, which is Tylenol. Read the labels to make sure that you are not taking more than the recommended dose. Too much acetaminophen (Tylenol) can be harmful. · Breathe warm, moist air from a steamy shower, a hot bath, or a sink filled with hot water. Avoid cold, dry air. Using a humidifier in your home may help. Follow the directions for cleaning the machine. · Use saline (saltwater) nasal washes to help keep your nasal passages open and wash out mucus and bacteria. You can buy saline nose drops at a grocery store or drugstore. Or you can make your own at home by adding 1 teaspoon of salt and 1 teaspoon of baking soda to 2 cups of distilled water. If you make your own, fill a bulb syringe with the solution, insert the tip into your nostril, and squeeze gently. [employer] [place] your nose.   · Put a hot, wet towel or a warm gel pack on your face 3 or 4 times a day for 5 to 10 minutes each time. · Try a decongestant nasal spray like oxymetazoline (Afrin). Do not use it for more than 3 days in a row. Using it for more than 3 days can make your congestion worse. When should you call for help? Call your doctor now or seek immediate medical care if:    · You have new or worse swelling or redness in your face or around your eyes.     · You have a new or higher fever.    Watch closely for changes in your health, and be sure to contact your doctor if:    · You have new or worse facial pain.     · The mucus from your nose becomes thicker (like pus) or has new blood in it.     · You are not getting better as expected. Where can you learn more? Go to http://chelo-andre.info/. Enter O243 in the search box to learn more about \"Sinusitis: Care Instructions. \"  Current as of: March 27, 2018  Content Version: 11.9  © 4213-4972 [employer]. Care instructions adapted under license by [employer] (which disclaims liability or warranty for this information). If you have questions about a medical condition or this instruction, always ask your healthcare professional. [address] any warranty or liability for your use of this information.

## 2019-03-12 NOTE — PROGRESS NOTES
Subjective:     HPI:  Nany Peacock is a 61 y.o. female who presents to the office today for routine care. Sinus infection: Pt complains of a cold lasting 1 month. She states that her symptoms have gotten worse the past 5-7 days. She complains of chills, sinus pain, and appetite loss. She has taken Mucinex cold and flu with no relief. She denies nausea and vomiting. Pt uses ecigarettes. Current Outpatient Medications   Medication Sig Dispense Refill    azithromycin (ZITHROMAX) 250 mg tablet Take 2 tablets today, then take 1 tablet daily 6 Tab 0    lisinopril (PRINIVIL, ZESTRIL) 20 mg tablet TAKE 1 TABLET BY MOUTH EVERY DAY 90 Tab 1    traZODone (DESYREL) 100 mg tablet TAKE 1 TABLET BY MOUTH NIGHTLY 90 Tab 1    ubidecarenone (COQ-10 PO) Take  by mouth.  calcium-cholecalciferol, d3, (CALCIUM 600 + D) 600-125 mg-unit tab Take  by mouth.  psyllium husk (METAMUCIL PO) Take  by mouth.  ibandronate (BONIVA) 150 mg tablet Take 150 mg by mouth every thirty (30) days. 3 Tab 2    albuterol (PROVENTIL HFA, VENTOLIN HFA, PROAIR HFA) 90 mcg/actuation inhaler Take 2 Puffs by inhalation every six (6) hours as needed for Wheezing or Shortness of Breath. 1 Inhaler 12    SUMAtriptan succinate 6 mg/0.5 mL kit INJECT 1 PEN BY SUBCUTANEOUS ROUTE ONCE AS NEEDED FOR MIGRAINE. 1 Kit 5    multivitamin (ONE A DAY) tablet Take 1 Tab by mouth daily.  balsalazide (COLAZAL) 750 mg capsule Take 2,250 mg by mouth three (3) times daily.           No Known Allergies    Past Medical History:   Diagnosis Date    Hypertension     Mesenteric ischemia (HCC)     Migraine     Tobacco abuse     Ulcerative colitis ([facility] [address] 2002        Past Surgical History:   Procedure Laterality Date    COLONOSCOPY N/A 4/7/2017    COLONOSCOPY performed by Pro Celestin MD at [facility]    HX CHOLECYSTECTOMY  10/29/2014       Family History   Problem Relation Age of Onset    Cancer Mother     Heart Disease Father  Parkinson's Disease Father     Breast Cancer Maternal Aunt        Social History     Socioeconomic History    Marital status:      Spouse name: Not on file    Number of children: Not on file    Years of education: Not on file    Highest education level: Not on file   Social Needs    Financial resource strain: Not on file    Food insecurity - worry: Not on file    Food insecurity - inability: Not on file   [employer] needs - medical: Not on file   [employer] needs - non-medical: Not on file   Occupational History    Not on file   Tobacco Use    Smoking status: Former Smoker     Types: Cigarettes     Last attempt to quit: 2014     Years since quittin.8    Smokeless tobacco: Never Used    Tobacco comment: patient uses vapor cigarettes/ ecigarettes   Substance and Sexual Activity    Alcohol use: No     Alcohol/week: 0.0 oz     Frequency: Never    Drug use: No    Sexual activity: Not Currently   Other Topics Concern    Not on file   Social History Narrative    Not on file       REVIEW OF SYSTEM:  Review of Systems   Constitutional: Positive for chills. Negative for fever. HENT: Positive for congestion and sinus pain. Eyes: Negative for blurred vision. Respiratory: Negative for shortness of breath. Cardiovascular: Negative for chest pain, palpitations and leg swelling. Gastrointestinal: Negative for constipation, diarrhea, nausea and vomiting. Musculoskeletal: Negative for joint pain. Neurological: Negative for headaches. Objective:     Visit Vitals  /68 (BP 1 Location: Right arm, BP Patient Position: Sitting)   Pulse 85   Temp 98.3 °F (36.8 °C) (Oral)   Resp 16   Ht 5' 8\" (1.727 m)   Wt 154 lb (69.9 kg)   SpO2 95%   BMI 23.42 kg/m²       PHYSICAL EXAM:  Physical Exam   Constitutional: She is oriented to person, place, and time and well-developed, well-nourished, and in no distress.    HENT:   Right Ear: Tympanic membrane, external ear and ear canal normal.   Left Ear: Tympanic membrane, external ear and ear canal normal.   Nose: Right sinus exhibits maxillary sinus tenderness and frontal sinus tenderness. Left sinus exhibits maxillary sinus tenderness and frontal sinus tenderness. Mouth/Throat: Oropharynx is clear and moist.   Eyes: Pupils are equal, round, and reactive to light. Neck: Normal range of motion. Neck supple. No thyromegaly present. Cardiovascular: Normal rate, regular rhythm, normal heart sounds and intact distal pulses. No murmur heard. Pulmonary/Chest: Effort normal and breath sounds normal. She has no wheezes. Neurological: She is alert and oriented to person, place, and time. Skin: Skin is warm and dry. Vitals reviewed. Assessment/Plan:       ICD-10-CM ICD-9-CM    1. Acute non-recurrent pansinusitis J01.40 461.8 azithromycin (ZITHROMAX) 250 mg tablet     Patient given opportunity to ask questions. Questions answered. Patient understands plan of care. Follow-up Disposition:  Return in about 6 weeks (around 4/23/2019) for routine follow up. .    Written by Hiwot Steven, as dictated by DO. JANET Garcia, Dr. Orlin Mcnulty, confirm that all documentation is accurate.

## 2019-03-12 NOTE — TELEPHONE ENCOUNTER
Pt called in requesting to be seen today. I let her know that the doctor was booked until April and she stated she believes she has a sinus infection and needs to be seen as soon as poss bile. I let her know I would put a message back to the nurse to see if she could get scheduled in sooner. Please advise.

## 2019-03-21 DIAGNOSIS — G43.109 MIGRAINE WITH AURA AND WITHOUT STATUS MIGRAINOSUS, NOT INTRACTABLE: ICD-10-CM

## 2019-03-29 RX ORDER — CEFUROXIME AXETIL 250 MG/1
TABLET ORAL
Qty: 1 KIT | Refills: 4 | Status: SHIPPED | OUTPATIENT
Start: 2019-03-29

## 2019-05-04 RX ORDER — TRAZODONE HYDROCHLORIDE 100 MG/1
TABLET ORAL
Qty: 90 TAB | Refills: 1 | Status: SHIPPED | OUTPATIENT
Start: 2019-05-04

## (undated) DEVICE — CONTAINER PREFIL FRMLN 15ML --

## (undated) DEVICE — MEDI-VAC NON-CONDUCTIVE SUCTION TUBING: Brand: CARDINAL HEALTH

## (undated) DEVICE — FLEX ADVANTAGE 1500CC: Brand: FLEX ADVANTAGE

## (undated) DEVICE — KENDALL 500 SERIES DIAPHORETIC FOAM MONITORING ELECTRODE - TEAR DROP SHAPE ( 30/PK): Brand: KENDALL

## (undated) DEVICE — SYR 50ML SLIP TIP NSAF LF STRL --

## (undated) DEVICE — BASIN EMESIS 500CC ROSE 250/CS 60/PLT: Brand: MEDEGEN MEDICAL PRODUCTS, LLC

## (undated) DEVICE — FORCEPS BX L240CM JAW DIA2.8MM L CAP W/ NDL MIC MESH TOOTH

## (undated) DEVICE — KIT COLON W/ 1.1OZ LUB AND 2 END